# Patient Record
Sex: MALE | Race: WHITE | NOT HISPANIC OR LATINO | Employment: FULL TIME | URBAN - METROPOLITAN AREA
[De-identification: names, ages, dates, MRNs, and addresses within clinical notes are randomized per-mention and may not be internally consistent; named-entity substitution may affect disease eponyms.]

---

## 2017-01-11 ENCOUNTER — HOSPITAL ENCOUNTER (OUTPATIENT)
Dept: LAB | Facility: MEDICAL CENTER | Age: 42
End: 2017-01-11
Attending: FAMILY MEDICINE
Payer: COMMERCIAL

## 2017-01-11 LAB
INR PPP: 2.18 (ref 0.87–1.13)
PROTHROMBIN TIME: 24.9 SEC (ref 12–14.6)

## 2017-01-11 PROCEDURE — 85610 PROTHROMBIN TIME: CPT

## 2017-01-11 PROCEDURE — 36415 COLL VENOUS BLD VENIPUNCTURE: CPT

## 2017-03-01 ENCOUNTER — HOSPITAL ENCOUNTER (OUTPATIENT)
Dept: LAB | Facility: MEDICAL CENTER | Age: 42
End: 2017-03-01
Attending: FAMILY MEDICINE
Payer: COMMERCIAL

## 2017-03-01 LAB
INR PPP: 2.11 (ref 0.87–1.13)
PROTHROMBIN TIME: 24.3 SEC (ref 12–14.6)

## 2017-03-01 PROCEDURE — 85610 PROTHROMBIN TIME: CPT

## 2017-03-01 PROCEDURE — 36415 COLL VENOUS BLD VENIPUNCTURE: CPT

## 2017-04-27 ENCOUNTER — HOSPITAL ENCOUNTER (OUTPATIENT)
Dept: LAB | Facility: MEDICAL CENTER | Age: 42
End: 2017-04-27
Attending: FAMILY MEDICINE
Payer: COMMERCIAL

## 2017-04-27 LAB
INR PPP: 1.85 (ref 0.87–1.13)
PROTHROMBIN TIME: 21.9 SEC (ref 12–14.6)

## 2017-04-27 PROCEDURE — 36415 COLL VENOUS BLD VENIPUNCTURE: CPT

## 2017-04-27 PROCEDURE — 85610 PROTHROMBIN TIME: CPT

## 2017-08-17 ENCOUNTER — HOSPITAL ENCOUNTER (OUTPATIENT)
Dept: LAB | Facility: MEDICAL CENTER | Age: 42
End: 2017-08-17
Attending: FAMILY MEDICINE
Payer: COMMERCIAL

## 2017-08-17 LAB
INR PPP: 1.92 (ref 0.87–1.13)
PROTHROMBIN TIME: 22.6 SEC (ref 12–14.6)

## 2017-08-17 PROCEDURE — 36415 COLL VENOUS BLD VENIPUNCTURE: CPT

## 2017-08-17 PROCEDURE — 85610 PROTHROMBIN TIME: CPT

## 2018-01-16 ENCOUNTER — HOSPITAL ENCOUNTER (OUTPATIENT)
Dept: LAB | Facility: MEDICAL CENTER | Age: 43
End: 2018-01-16
Attending: FAMILY MEDICINE
Payer: COMMERCIAL

## 2018-01-16 LAB
INR PPP: 2.16 (ref 0.87–1.13)
PROTHROMBIN TIME: 23.8 SEC (ref 12–14.6)

## 2018-01-16 PROCEDURE — 85610 PROTHROMBIN TIME: CPT

## 2018-01-16 PROCEDURE — 36415 COLL VENOUS BLD VENIPUNCTURE: CPT

## 2018-04-26 ENCOUNTER — HOSPITAL ENCOUNTER (OUTPATIENT)
Dept: LAB | Facility: MEDICAL CENTER | Age: 43
End: 2018-04-26
Attending: FAMILY MEDICINE
Payer: COMMERCIAL

## 2018-04-26 LAB
INR PPP: 1.63 (ref 0.87–1.13)
PROTHROMBIN TIME: 19 SEC (ref 12–14.6)

## 2018-04-26 PROCEDURE — 36415 COLL VENOUS BLD VENIPUNCTURE: CPT

## 2018-04-26 PROCEDURE — 85610 PROTHROMBIN TIME: CPT

## 2018-05-10 ENCOUNTER — HOSPITAL ENCOUNTER (OUTPATIENT)
Dept: LAB | Facility: MEDICAL CENTER | Age: 43
End: 2018-05-10
Attending: FAMILY MEDICINE
Payer: COMMERCIAL

## 2018-05-10 LAB
INR PPP: 3.5 (ref 0.87–1.13)
PROTHROMBIN TIME: 34.9 SEC (ref 12–14.6)

## 2018-05-10 PROCEDURE — 85610 PROTHROMBIN TIME: CPT

## 2018-05-10 PROCEDURE — 36415 COLL VENOUS BLD VENIPUNCTURE: CPT

## 2018-07-26 ENCOUNTER — HOSPITAL ENCOUNTER (OUTPATIENT)
Dept: LAB | Facility: MEDICAL CENTER | Age: 43
End: 2018-07-26
Attending: FAMILY MEDICINE
Payer: COMMERCIAL

## 2018-07-26 LAB
ALBUMIN SERPL BCP-MCNC: 4.4 G/DL (ref 3.2–4.9)
ALBUMIN/GLOB SERPL: 1.8 G/DL
ALP SERPL-CCNC: 52 U/L (ref 30–99)
ALT SERPL-CCNC: 35 U/L (ref 2–50)
ANION GAP SERPL CALC-SCNC: 8 MMOL/L (ref 0–11.9)
AST SERPL-CCNC: 30 U/L (ref 12–45)
BASOPHILS # BLD AUTO: 0.8 % (ref 0–1.8)
BASOPHILS # BLD: 0.04 K/UL (ref 0–0.12)
BILIRUB SERPL-MCNC: 1.3 MG/DL (ref 0.1–1.5)
BUN SERPL-MCNC: 18 MG/DL (ref 8–22)
CALCIUM SERPL-MCNC: 9.4 MG/DL (ref 8.5–10.5)
CHLORIDE SERPL-SCNC: 106 MMOL/L (ref 96–112)
CHOLEST SERPL-MCNC: 220 MG/DL (ref 100–199)
CO2 SERPL-SCNC: 24 MMOL/L (ref 20–33)
CREAT SERPL-MCNC: 0.92 MG/DL (ref 0.5–1.4)
EOSINOPHIL # BLD AUTO: 0.17 K/UL (ref 0–0.51)
EOSINOPHIL NFR BLD: 3.3 % (ref 0–6.9)
ERYTHROCYTE [DISTWIDTH] IN BLOOD BY AUTOMATED COUNT: 39 FL (ref 35.9–50)
GLOBULIN SER CALC-MCNC: 2.5 G/DL (ref 1.9–3.5)
GLUCOSE SERPL-MCNC: 96 MG/DL (ref 65–99)
HCT VFR BLD AUTO: 48 % (ref 42–52)
HGB BLD-MCNC: 16.6 G/DL (ref 14–18)
IMM GRANULOCYTES # BLD AUTO: 0 K/UL (ref 0–0.11)
IMM GRANULOCYTES NFR BLD AUTO: 0 % (ref 0–0.9)
INR PPP: 1.75 (ref 0.87–1.13)
LYMPHOCYTES # BLD AUTO: 2.04 K/UL (ref 1–4.8)
LYMPHOCYTES NFR BLD: 40.2 % (ref 22–41)
MCH RBC QN AUTO: 30.5 PG (ref 27–33)
MCHC RBC AUTO-ENTMCNC: 34.6 G/DL (ref 33.7–35.3)
MCV RBC AUTO: 88.2 FL (ref 81.4–97.8)
MONOCYTES # BLD AUTO: 0.5 K/UL (ref 0–0.85)
MONOCYTES NFR BLD AUTO: 9.8 % (ref 0–13.4)
NEUTROPHILS # BLD AUTO: 2.33 K/UL (ref 1.82–7.42)
NEUTROPHILS NFR BLD: 45.9 % (ref 44–72)
NRBC # BLD AUTO: 0 K/UL
NRBC BLD-RTO: 0 /100 WBC
PLATELET # BLD AUTO: 164 K/UL (ref 164–446)
PMV BLD AUTO: 11.1 FL (ref 9–12.9)
POTASSIUM SERPL-SCNC: 4.2 MMOL/L (ref 3.6–5.5)
PROT SERPL-MCNC: 6.9 G/DL (ref 6–8.2)
PROTHROMBIN TIME: 20.1 SEC (ref 12–14.6)
RBC # BLD AUTO: 5.44 M/UL (ref 4.7–6.1)
SODIUM SERPL-SCNC: 138 MMOL/L (ref 135–145)
WBC # BLD AUTO: 5.1 K/UL (ref 4.8–10.8)

## 2018-07-26 PROCEDURE — 85610 PROTHROMBIN TIME: CPT

## 2018-07-26 PROCEDURE — 85025 COMPLETE CBC W/AUTO DIFF WBC: CPT

## 2018-07-26 PROCEDURE — 80053 COMPREHEN METABOLIC PANEL: CPT

## 2018-07-26 PROCEDURE — 36415 COLL VENOUS BLD VENIPUNCTURE: CPT

## 2018-07-26 PROCEDURE — 82465 ASSAY BLD/SERUM CHOLESTEROL: CPT

## 2018-10-08 ENCOUNTER — IMMUNIZATION (OUTPATIENT)
Dept: SOCIAL WORK | Facility: CLINIC | Age: 43
End: 2018-10-08
Payer: COMMERCIAL

## 2018-10-08 DIAGNOSIS — Z23 NEED FOR VACCINATION: ICD-10-CM

## 2018-10-08 PROCEDURE — 90686 IIV4 VACC NO PRSV 0.5 ML IM: CPT | Performed by: REGISTERED NURSE

## 2018-10-08 PROCEDURE — 90471 IMMUNIZATION ADMIN: CPT | Performed by: REGISTERED NURSE

## 2018-10-23 ENCOUNTER — HOSPITAL ENCOUNTER (OUTPATIENT)
Dept: LAB | Facility: MEDICAL CENTER | Age: 43
End: 2018-10-23
Attending: FAMILY MEDICINE
Payer: COMMERCIAL

## 2018-10-23 LAB
INR PPP: 2.24 (ref 0.87–1.13)
PROTHROMBIN TIME: 24.9 SEC (ref 12–14.6)

## 2018-10-23 PROCEDURE — 85610 PROTHROMBIN TIME: CPT

## 2018-10-23 PROCEDURE — 36415 COLL VENOUS BLD VENIPUNCTURE: CPT

## 2019-02-20 ENCOUNTER — HOSPITAL ENCOUNTER (OUTPATIENT)
Dept: LAB | Facility: MEDICAL CENTER | Age: 44
End: 2019-02-20
Attending: FAMILY MEDICINE
Payer: COMMERCIAL

## 2019-02-20 LAB
INR PPP: 1.7 (ref 0.87–1.13)
PROTHROMBIN TIME: 20 SEC (ref 12–14.6)

## 2019-02-20 PROCEDURE — 36415 COLL VENOUS BLD VENIPUNCTURE: CPT

## 2019-02-20 PROCEDURE — 85610 PROTHROMBIN TIME: CPT

## 2019-03-27 ENCOUNTER — APPOINTMENT (RX ONLY)
Dept: URBAN - METROPOLITAN AREA CLINIC 20 | Facility: CLINIC | Age: 44
Setting detail: DERMATOLOGY
End: 2019-03-27

## 2019-03-27 DIAGNOSIS — D22 MELANOCYTIC NEVI: ICD-10-CM

## 2019-03-27 DIAGNOSIS — L57.0 ACTINIC KERATOSIS: ICD-10-CM

## 2019-03-27 DIAGNOSIS — L57.8 OTHER SKIN CHANGES DUE TO CHRONIC EXPOSURE TO NONIONIZING RADIATION: ICD-10-CM

## 2019-03-27 DIAGNOSIS — D18.0 HEMANGIOMA: ICD-10-CM

## 2019-03-27 DIAGNOSIS — L82.1 OTHER SEBORRHEIC KERATOSIS: ICD-10-CM

## 2019-03-27 DIAGNOSIS — L73.2 HIDRADENITIS SUPPURATIVA: ICD-10-CM

## 2019-03-27 DIAGNOSIS — L81.4 OTHER MELANIN HYPERPIGMENTATION: ICD-10-CM

## 2019-03-27 PROBLEM — D22.5 MELANOCYTIC NEVI OF TRUNK: Status: ACTIVE | Noted: 2019-03-27

## 2019-03-27 PROBLEM — D22.61 MELANOCYTIC NEVI OF RIGHT UPPER LIMB, INCLUDING SHOULDER: Status: ACTIVE | Noted: 2019-03-27

## 2019-03-27 PROBLEM — D22.72 MELANOCYTIC NEVI OF LEFT LOWER LIMB, INCLUDING HIP: Status: ACTIVE | Noted: 2019-03-27

## 2019-03-27 PROBLEM — I63.50 CEREBRAL INFARCTION DUE TO UNSPECIFIED OCCLUSION OR STENOSIS OF UNSPECIFIED CEREBRAL ARTERY: Status: ACTIVE | Noted: 2019-03-27

## 2019-03-27 PROBLEM — D22.62 MELANOCYTIC NEVI OF LEFT UPPER LIMB, INCLUDING SHOULDER: Status: ACTIVE | Noted: 2019-03-27

## 2019-03-27 PROBLEM — D18.01 HEMANGIOMA OF SKIN AND SUBCUTANEOUS TISSUE: Status: ACTIVE | Noted: 2019-03-27

## 2019-03-27 PROBLEM — D22.71 MELANOCYTIC NEVI OF RIGHT LOWER LIMB, INCLUDING HIP: Status: ACTIVE | Noted: 2019-03-27

## 2019-03-27 PROCEDURE — ? COUNSELING

## 2019-03-27 PROCEDURE — 17000 DESTRUCT PREMALG LESION: CPT

## 2019-03-27 PROCEDURE — ? LIQUID NITROGEN

## 2019-03-27 PROCEDURE — ? OBSERVATION AND MEASURE

## 2019-03-27 PROCEDURE — 99214 OFFICE O/P EST MOD 30 MIN: CPT | Mod: 25

## 2019-03-27 ASSESSMENT — LOCATION DETAILED DESCRIPTION DERM
LOCATION DETAILED: LEFT MEDIAL SUPERIOR CHEST
LOCATION DETAILED: RIGHT DISTAL POSTERIOR THIGH
LOCATION DETAILED: RIGHT ANTERIOR PROXIMAL UPPER ARM
LOCATION DETAILED: LEFT INFERIOR CENTRAL MALAR CHEEK
LOCATION DETAILED: RIGHT PROXIMAL DORSAL FOREARM
LOCATION DETAILED: RIGHT SUPERIOR MEDIAL UPPER BACK
LOCATION DETAILED: LEFT ANTERIOR DISTAL THIGH
LOCATION DETAILED: RIGHT ANTERIOR DISTAL THIGH
LOCATION DETAILED: RIGHT CENTRAL MALAR CHEEK
LOCATION DETAILED: LEFT ANTERIOR PROXIMAL UPPER ARM
LOCATION DETAILED: LEFT LATERAL KNEE
LOCATION DETAILED: RIGHT INFERIOR UPPER BACK
LOCATION DETAILED: RIGHT MID-UPPER BACK
LOCATION DETAILED: LEFT PROXIMAL DORSAL FOREARM
LOCATION DETAILED: LEFT SUPERIOR HELIX
LOCATION DETAILED: LEFT DISTAL POSTERIOR THIGH

## 2019-03-27 ASSESSMENT — LOCATION SIMPLE DESCRIPTION DERM
LOCATION SIMPLE: RIGHT THIGH
LOCATION SIMPLE: RIGHT CHEEK
LOCATION SIMPLE: RIGHT UPPER BACK
LOCATION SIMPLE: LEFT EAR
LOCATION SIMPLE: RIGHT FOREARM
LOCATION SIMPLE: RIGHT POSTERIOR THIGH
LOCATION SIMPLE: LEFT CHEEK
LOCATION SIMPLE: LEFT POSTERIOR THIGH
LOCATION SIMPLE: LEFT THIGH
LOCATION SIMPLE: LEFT KNEE
LOCATION SIMPLE: LEFT UPPER ARM
LOCATION SIMPLE: CHEST
LOCATION SIMPLE: LEFT FOREARM
LOCATION SIMPLE: RIGHT UPPER ARM

## 2019-03-27 ASSESSMENT — LOCATION ZONE DERM
LOCATION ZONE: EAR
LOCATION ZONE: TRUNK
LOCATION ZONE: LEG
LOCATION ZONE: ARM
LOCATION ZONE: FACE

## 2019-05-22 ENCOUNTER — HOSPITAL ENCOUNTER (OUTPATIENT)
Dept: LAB | Facility: MEDICAL CENTER | Age: 44
End: 2019-05-22
Attending: FAMILY MEDICINE
Payer: COMMERCIAL

## 2019-05-22 LAB
INR PPP: 1.91 (ref 0.87–1.13)
PROTHROMBIN TIME: 22 SEC (ref 12–14.6)

## 2019-05-22 PROCEDURE — 85610 PROTHROMBIN TIME: CPT

## 2019-05-22 PROCEDURE — 36415 COLL VENOUS BLD VENIPUNCTURE: CPT

## 2019-11-07 ENCOUNTER — HOSPITAL ENCOUNTER (OUTPATIENT)
Dept: LAB | Facility: MEDICAL CENTER | Age: 44
End: 2019-11-07
Attending: FAMILY MEDICINE
Payer: COMMERCIAL

## 2019-11-07 LAB
ALBUMIN SERPL BCP-MCNC: 4.6 G/DL (ref 3.2–4.9)
ALBUMIN/GLOB SERPL: 2.1 G/DL
ALP SERPL-CCNC: 59 U/L (ref 30–99)
ALT SERPL-CCNC: 32 U/L (ref 2–50)
ANION GAP SERPL CALC-SCNC: 9 MMOL/L (ref 0–11.9)
AST SERPL-CCNC: 24 U/L (ref 12–45)
BASOPHILS # BLD AUTO: 0.6 % (ref 0–1.8)
BASOPHILS # BLD: 0.03 K/UL (ref 0–0.12)
BILIRUB SERPL-MCNC: 0.8 MG/DL (ref 0.1–1.5)
BUN SERPL-MCNC: 17 MG/DL (ref 8–22)
CALCIUM SERPL-MCNC: 9.4 MG/DL (ref 8.5–10.5)
CHLORIDE SERPL-SCNC: 107 MMOL/L (ref 96–112)
CO2 SERPL-SCNC: 24 MMOL/L (ref 20–33)
CREAT SERPL-MCNC: 1 MG/DL (ref 0.5–1.4)
EOSINOPHIL # BLD AUTO: 0.17 K/UL (ref 0–0.51)
EOSINOPHIL NFR BLD: 3.1 % (ref 0–6.9)
ERYTHROCYTE [DISTWIDTH] IN BLOOD BY AUTOMATED COUNT: 38.4 FL (ref 35.9–50)
GLOBULIN SER CALC-MCNC: 2.2 G/DL (ref 1.9–3.5)
GLUCOSE SERPL-MCNC: 95 MG/DL (ref 65–99)
HCT VFR BLD AUTO: 47.2 % (ref 42–52)
HGB BLD-MCNC: 16.3 G/DL (ref 14–18)
IMM GRANULOCYTES # BLD AUTO: 0.01 K/UL (ref 0–0.11)
IMM GRANULOCYTES NFR BLD AUTO: 0.2 % (ref 0–0.9)
LYMPHOCYTES # BLD AUTO: 1.83 K/UL (ref 1–4.8)
LYMPHOCYTES NFR BLD: 33.6 % (ref 22–41)
MCH RBC QN AUTO: 31.2 PG (ref 27–33)
MCHC RBC AUTO-ENTMCNC: 34.5 G/DL (ref 33.7–35.3)
MCV RBC AUTO: 90.2 FL (ref 81.4–97.8)
MONOCYTES # BLD AUTO: 0.56 K/UL (ref 0–0.85)
MONOCYTES NFR BLD AUTO: 10.3 % (ref 0–13.4)
NEUTROPHILS # BLD AUTO: 2.85 K/UL (ref 1.82–7.42)
NEUTROPHILS NFR BLD: 52.2 % (ref 44–72)
NRBC # BLD AUTO: 0 K/UL
NRBC BLD-RTO: 0 /100 WBC
PLATELET # BLD AUTO: 172 K/UL (ref 164–446)
PMV BLD AUTO: 11.6 FL (ref 9–12.9)
POTASSIUM SERPL-SCNC: 4.4 MMOL/L (ref 3.6–5.5)
PROT SERPL-MCNC: 6.8 G/DL (ref 6–8.2)
RBC # BLD AUTO: 5.23 M/UL (ref 4.7–6.1)
SODIUM SERPL-SCNC: 140 MMOL/L (ref 135–145)
WBC # BLD AUTO: 5.5 K/UL (ref 4.8–10.8)

## 2019-11-07 PROCEDURE — 86147 CARDIOLIPIN ANTIBODY EA IG: CPT

## 2019-11-07 PROCEDURE — 85025 COMPLETE CBC W/AUTO DIFF WBC: CPT

## 2019-11-07 PROCEDURE — 80053 COMPREHEN METABOLIC PANEL: CPT

## 2019-11-07 PROCEDURE — 86146 BETA-2 GLYCOPROTEIN ANTIBODY: CPT | Mod: 91

## 2019-11-07 PROCEDURE — 36415 COLL VENOUS BLD VENIPUNCTURE: CPT

## 2019-11-10 LAB
B2 GLYCOPROT1 IGA SER-ACNC: 5 SAU (ref 0–20)
B2 GLYCOPROT1 IGG SERPL IA-ACNC: 21 SGU (ref 0–20)
B2 GLYCOPROT1 IGM SERPL IA-ACNC: 1 SMU (ref 0–20)
CARDIOLIPIN IGA SER IA-ACNC: 0 APL (ref 0–11)
CARDIOLIPIN IGG SER IA-ACNC: 3 GPL (ref 0–14)
CARDIOLIPIN IGM SER IA-ACNC: 7 MPL (ref 0–12)

## 2020-02-25 ENCOUNTER — HOSPITAL ENCOUNTER (OUTPATIENT)
Dept: LAB | Facility: MEDICAL CENTER | Age: 45
End: 2020-02-25
Attending: FAMILY MEDICINE
Payer: COMMERCIAL

## 2020-02-25 PROCEDURE — 36415 COLL VENOUS BLD VENIPUNCTURE: CPT

## 2020-02-25 PROCEDURE — 86146 BETA-2 GLYCOPROTEIN ANTIBODY: CPT | Mod: 91

## 2020-02-27 LAB
B2 GLYCOPROT1 IGG SERPL IA-ACNC: 26 SGU (ref 0–20)
B2 GLYCOPROT1 IGM SERPL IA-ACNC: 10 SMU (ref 0–20)

## 2020-03-30 ENCOUNTER — APPOINTMENT (OUTPATIENT)
Dept: HEMATOLOGY ONCOLOGY | Facility: MEDICAL CENTER | Age: 45
End: 2020-03-30
Payer: COMMERCIAL

## 2020-07-28 ENCOUNTER — HOSPITAL ENCOUNTER (OUTPATIENT)
Dept: LAB | Facility: MEDICAL CENTER | Age: 45
End: 2020-07-28
Attending: INTERNAL MEDICINE
Payer: COMMERCIAL

## 2020-07-28 ENCOUNTER — OFFICE VISIT (OUTPATIENT)
Dept: HEMATOLOGY ONCOLOGY | Facility: MEDICAL CENTER | Age: 45
End: 2020-07-28
Payer: COMMERCIAL

## 2020-07-28 VITALS
WEIGHT: 252.21 LBS | TEMPERATURE: 98.2 F | DIASTOLIC BLOOD PRESSURE: 74 MMHG | HEIGHT: 78 IN | HEART RATE: 60 BPM | OXYGEN SATURATION: 99 % | BODY MASS INDEX: 29.18 KG/M2 | RESPIRATION RATE: 16 BRPM | SYSTOLIC BLOOD PRESSURE: 130 MMHG

## 2020-07-28 DIAGNOSIS — D68.61 ANTIPHOSPHOLIPID ANTIBODY SYNDROME (HCC): ICD-10-CM

## 2020-07-28 PROCEDURE — 86038 ANTINUCLEAR ANTIBODIES: CPT

## 2020-07-28 PROCEDURE — 86147 CARDIOLIPIN ANTIBODY EA IG: CPT | Mod: 91

## 2020-07-28 PROCEDURE — 99204 OFFICE O/P NEW MOD 45 MIN: CPT | Performed by: INTERNAL MEDICINE

## 2020-07-28 PROCEDURE — 86146 BETA-2 GLYCOPROTEIN ANTIBODY: CPT

## 2020-07-28 PROCEDURE — 36415 COLL VENOUS BLD VENIPUNCTURE: CPT

## 2020-07-28 ASSESSMENT — PATIENT HEALTH QUESTIONNAIRE - PHQ9: CLINICAL INTERPRETATION OF PHQ2 SCORE: 0

## 2020-07-28 ASSESSMENT — FIBROSIS 4 INDEX: FIB4 SCORE: 1.09

## 2020-07-28 NOTE — PROGRESS NOTES
"07/28/20    Subjective    Chief Complaint:  Anticardiolipin antibody syndrome    HPI:  44 male referred by Dr. César Cabezas because of h/o DVT and TIA and a positive anti beta 2 glycoprotein titer a few months ago.   Has a h/o a significant stroke 15 years ago with aphasia and right hemiparesis requiring rehab He may have been seen by Dr. Rasmussen at that time and was diagnosed with antiphospholipid antibody syndrome. Has been on coumadin ever since, recently changed to Xarelto about 2 months ago. No h/o DVT. No FH of clots or strokes.     ROS:    Constitutional: No weight loss  Skin: No rash or jaundice  HENT: No change in eyesight or hearing  Cardiovascular:No chest pain or arrythmia  Respiratory:No cough or SOB  GI:No nausea, vomiting, diarrhea, constipation  :No dysuria or frequency  Musculoskeletal:No bone or joint pain  Neuro:No sx's of neuropathy  Psych: No complaints    PMH:      No Known Allergies    Past Medical History:   Diagnosis Date   • Cancer    • Stroke '04        No past surgical history on file.     Medications:    Current Outpatient Medications on File Prior to Visit   Medication Sig Dispense Refill   • rivaroxaban (XARELTO) 10 MG Tab tablet Take 10 mg by mouth.     • COUMADIN PO Take  by mouth.     • SIMVASTATIN PO Take  by mouth.       No current facility-administered medications on file prior to visit.        Social History     Tobacco Use   • Smoking status: Current Every Day Smoker   • Tobacco comment: CHEW   Substance Use Topics   • Alcohol use: No        No family history on file.     Objective    Vitals:    /74 (BP Location: Right arm, Patient Position: Sitting, BP Cuff Size: Adult)   Pulse 60   Temp 36.8 °C (98.2 °F) (Temporal)   Resp 16   Ht 1.97 m (6' 5.56\")   Wt 114.4 kg (252 lb 3.3 oz)   SpO2 99%   BMI 29.48 kg/m²     Physical Exam:    Appears well-developed and well-nourished. No distress.    Head -  Normocephalic .   Eyes - Pupils are equal, round, and reactive to " light. Conjunctivae and EOM are normal. No scleral icterus.   Ears - normal hearing  Mouth - Throat: Oropharynx is clear and moist. No oropharyngeal exudate  Neck - Normal range of motion. Neck supple. No thyromegaly  Cardiovascular - Normal rate, regular rhythm, normal heart sounds and intact distal pulses. No  gallop, murmur or rub  Pulmonary - Normal breath sounds.  No wheeze, rales or rhonci  Abdominal -Soft. No distension, tenderness, organomegaly or mass  Extremities-  No edema or tenderness.    Nodes - No submental, submandibular, preauricular, cervical, axillary or inguinal adenopathy.    Neurological -   Alert and oriented to person, place, and time. No focal findings  Skin - Skin is warm and dry. No rash noted. Not diaphoretic. No erythema. No pallor. No jaundice   Psychiatric -  Normal mood and affect.    Labs:      Assessment    Imp:Results for BRIEN CARLISLE (MRN [id number]) as of 7/28/2020 09:22   Ref. Range 11/7/2019 09:04 2/25/2020 13:06   Anti-Cardiolipin Ab Iga Latest Ref Range: 0 - 11 APL 0    Anti-Cardiolipin Ab Igm Latest Ref Range: 0 - 12 MPL 7    Anti-Cariolipin Ab Igg Latest Ref Range: 0 - 14 GPL 3    Beta-2 Glycoprotein I Ab Igg Latest Ref Range: 0 - 20 SGU 21 (H) 26 (H)   Beta-2 Glycoprotein I Iga Latest Ref Range: 0 - 20 TRINA 5    Beta-2 Glycoprotein I Igm Latest Ref Range: 0 - 20 SMU 1 10       Visit Diagnosis:    1. Antiphospholipid antibody syndrome (HCC)  BETA-2 GLYCOPROTEIN AB,IGG,IGM    LESLIE REFLEXIVE PROFILE    ANTICARDIOLIPIN AB IGG,IGM,IGA         Plan:  Above lab - if confirmatory, the literature does suggest that coumadin is more effective than Xa inhibitors or direct thromib inhibitors for this syndrome.   Patient will call me later this week for lab results    Ketan Main M.D.

## 2020-07-30 LAB
CARDIOLIPIN IGA SER IA-ACNC: 3 APL (ref 0–11)
CARDIOLIPIN IGG SER IA-ACNC: 6 GPL (ref 0–14)
CARDIOLIPIN IGM SER IA-ACNC: 4 MPL (ref 0–12)
NUCLEAR IGG SER QL IA: NORMAL

## 2020-07-31 ENCOUNTER — TELEPHONE (OUTPATIENT)
Dept: HEMATOLOGY ONCOLOGY | Facility: MEDICAL CENTER | Age: 45
End: 2020-07-31

## 2020-07-31 LAB
B2 GLYCOPROT1 IGG SERPL IA-ACNC: 26 SGU (ref 0–20)
B2 GLYCOPROT1 IGM SERPL IA-ACNC: 5 SMU (ref 0–20)

## 2020-07-31 NOTE — TELEPHONE ENCOUNTER
Per Dr. Jose David Main M.D.  Lottie Frazier               You might call and let him know the lab still confirms antiphospholipid antibody syndrome so he is stuck with having to continue coumadin indefinitely.      Called pt and reported above information. Pt agreed and verbalized understanding.

## 2020-08-31 ENCOUNTER — APPOINTMENT (RX ONLY)
Dept: URBAN - METROPOLITAN AREA CLINIC 20 | Facility: CLINIC | Age: 45
Setting detail: DERMATOLOGY
End: 2020-08-31

## 2020-08-31 DIAGNOSIS — L57.8 OTHER SKIN CHANGES DUE TO CHRONIC EXPOSURE TO NONIONIZING RADIATION: ICD-10-CM

## 2020-08-31 DIAGNOSIS — L73.8 OTHER SPECIFIED FOLLICULAR DISORDERS: ICD-10-CM | Status: STABLE

## 2020-08-31 DIAGNOSIS — D22 MELANOCYTIC NEVI: ICD-10-CM

## 2020-08-31 DIAGNOSIS — L81.4 OTHER MELANIN HYPERPIGMENTATION: ICD-10-CM

## 2020-08-31 DIAGNOSIS — L82.1 OTHER SEBORRHEIC KERATOSIS: ICD-10-CM

## 2020-08-31 DIAGNOSIS — D18.0 HEMANGIOMA: ICD-10-CM

## 2020-08-31 DIAGNOSIS — L73.2 HIDRADENITIS SUPPURATIVA: ICD-10-CM | Status: WELL CONTROLLED

## 2020-08-31 PROBLEM — D22.61 MELANOCYTIC NEVI OF RIGHT UPPER LIMB, INCLUDING SHOULDER: Status: ACTIVE | Noted: 2020-08-31

## 2020-08-31 PROBLEM — D18.01 HEMANGIOMA OF SKIN AND SUBCUTANEOUS TISSUE: Status: ACTIVE | Noted: 2020-08-31

## 2020-08-31 PROBLEM — D22.5 MELANOCYTIC NEVI OF TRUNK: Status: ACTIVE | Noted: 2020-08-31

## 2020-08-31 PROBLEM — D22.72 MELANOCYTIC NEVI OF LEFT LOWER LIMB, INCLUDING HIP: Status: ACTIVE | Noted: 2020-08-31

## 2020-08-31 PROBLEM — D22.62 MELANOCYTIC NEVI OF LEFT UPPER LIMB, INCLUDING SHOULDER: Status: ACTIVE | Noted: 2020-08-31

## 2020-08-31 PROBLEM — D22.71 MELANOCYTIC NEVI OF RIGHT LOWER LIMB, INCLUDING HIP: Status: ACTIVE | Noted: 2020-08-31

## 2020-08-31 PROCEDURE — ? COUNSELING

## 2020-08-31 PROCEDURE — ? OBSERVATION AND MEASURE

## 2020-08-31 PROCEDURE — ? ADDITIONAL NOTES

## 2020-08-31 PROCEDURE — 99214 OFFICE O/P EST MOD 30 MIN: CPT

## 2020-08-31 ASSESSMENT — LOCATION SIMPLE DESCRIPTION DERM
LOCATION SIMPLE: LEFT CHEEK
LOCATION SIMPLE: RIGHT THIGH
LOCATION SIMPLE: LEFT KNEE
LOCATION SIMPLE: RIGHT UPPER BACK
LOCATION SIMPLE: RIGHT UPPER ARM
LOCATION SIMPLE: LEFT UPPER ARM
LOCATION SIMPLE: RIGHT CHEEK
LOCATION SIMPLE: CHEST
LOCATION SIMPLE: RIGHT FOREARM
LOCATION SIMPLE: LEFT POSTERIOR THIGH
LOCATION SIMPLE: RIGHT POSTERIOR THIGH
LOCATION SIMPLE: LEFT THIGH
LOCATION SIMPLE: LEFT FOREARM

## 2020-08-31 ASSESSMENT — LOCATION DETAILED DESCRIPTION DERM
LOCATION DETAILED: RIGHT CENTRAL MALAR CHEEK
LOCATION DETAILED: LEFT DISTAL POSTERIOR THIGH
LOCATION DETAILED: RIGHT INFERIOR UPPER BACK
LOCATION DETAILED: RIGHT ANTERIOR DISTAL THIGH
LOCATION DETAILED: LEFT LATERAL KNEE
LOCATION DETAILED: LEFT MEDIAL SUPERIOR CHEST
LOCATION DETAILED: LEFT PROXIMAL DORSAL FOREARM
LOCATION DETAILED: RIGHT SUPERIOR MEDIAL UPPER BACK
LOCATION DETAILED: LEFT ANTERIOR PROXIMAL UPPER ARM
LOCATION DETAILED: LEFT INFERIOR CENTRAL MALAR CHEEK
LOCATION DETAILED: LEFT ANTERIOR DISTAL THIGH
LOCATION DETAILED: RIGHT MID-UPPER BACK
LOCATION DETAILED: RIGHT ANTERIOR PROXIMAL UPPER ARM
LOCATION DETAILED: RIGHT DISTAL POSTERIOR THIGH
LOCATION DETAILED: RIGHT PROXIMAL DORSAL FOREARM

## 2020-08-31 ASSESSMENT — LOCATION ZONE DERM
LOCATION ZONE: TRUNK
LOCATION ZONE: ARM
LOCATION ZONE: LEG
LOCATION ZONE: FACE

## 2020-08-31 NOTE — HPI: MOLE CHECK
What Is The Reason For Today's Visit?: Mole Check
Additional History: Patient presents for follow up on an Observe and Monitor benign nevi of the left lateral knee. 1 cm x 9mm

## 2020-09-18 DIAGNOSIS — D68.61 ANTIPHOSPHOLIPID ANTIBODY SYNDROME (HCC): ICD-10-CM

## 2020-09-21 ENCOUNTER — ANTICOAGULATION VISIT (OUTPATIENT)
Dept: VASCULAR LAB | Facility: MEDICAL CENTER | Age: 45
End: 2020-09-21
Attending: INTERNAL MEDICINE
Payer: COMMERCIAL

## 2020-09-21 DIAGNOSIS — D68.61 ANTIPHOSPHOLIPID ANTIBODY SYNDROME (HCC): ICD-10-CM

## 2020-09-21 LAB — INR PPP: 1 (ref 2–3.5)

## 2020-09-21 PROCEDURE — 85610 PROTHROMBIN TIME: CPT

## 2020-09-21 PROCEDURE — 99212 OFFICE O/P EST SF 10 MIN: CPT

## 2020-09-21 RX ORDER — WARFARIN SODIUM 5 MG/1
TABLET ORAL
Qty: 30 TAB | Refills: 3 | Status: SHIPPED | OUTPATIENT
Start: 2020-09-21 | End: 2020-10-20 | Stop reason: SDUPTHER

## 2020-09-21 NOTE — PROGRESS NOTES
Pt is NOT new to warfarin but is new to [facility].  Discussed indication for warfarin therapy and INR goal range. Explained our services, hours of operation, warfarin therapy, potential SE, potential DI. Discussed diet at length, with an emphasis on foods rich in vitamin K.  Discussed monitoring parameters, such as blood in urine, blood in stool, discussed what to do if a dose is missed, or suspected as missed.  Emphasized importance of compliance including follow up. Discussed lifestyle choices of ETOH & smoking and its impact on therapy.    HPI: Mahesh De Paz is a 45yo male who arrives at [facility] today to transition from Xarelto to warfarin. He was recently diagnosed with antiphospholipid antibody (APL) syndrome, though the time he was diagnosed is unclear. He is not new to warfarin and had been taking warfarin ever since his first stroke 15 years ago and was being followed by Dr. Cabezas. He claims he was stable on a regimen of 7.5mg one day and 5mg all other days per week. Past note says that patient also had a DVT before, but when pt was asked he denied any DVT, only his stroke. Recently, however, he was switched over to Xarelto 10mg once daily 2 months ago.  He was referred to us by Dr. Theron Herbert to convert back from Xarelto to warfarin due to his diagnosis of APL syndrome.    S/O:  ASA, NSAIDs, Antiplatelets No  Other medications: Simvastatin - pt says he is no longer taking this so removed from his med list  Can pt be transitioned to DOAC? No, pt has been diagnosed with APL syndrome so warfarin is the preferred anticoagulant.  Pt signed new patient contract.  Copy will be scanned into media.   Pt denies any unusual s/s of bleeding, bruising, clotting or any changes to diet or medications.    CHADSVASC = n/a  HASBLED= n/a    Abnormal  Liver (cirrhosis, bilirubin 2x ULN, AST/ALT) No    Kidney (SCr >2.6) No  Stroke Yes. Stroke 15 yrs ago w/ aphasia and rt hemiparesis requiring rehab.  Bleeding predisposition  No  Labile INR Unknown  Elderly (>65) No  Drugs (Nsaid, antiplatelet, alcohol) No    A/P:  Told patient that he would need to bridge with Lovenox until his INR becomes therapeutic. However, patient does not wish to proceed with injections and would rather avoid altogether. Will have patient instead bridge off-label with Xarelto 10mg qday (his current regimen) and start warfarin today. Based on pt's past history with warfarin, will have him take 7.5mg today and then take 5mg every other day. Sent over an Rx for warfarin 5mg tablets to his preferred pharmacy.    Of note, pt likely will get a repeat cmp/cbc at his upcoming annual wellness check in November.    Follow up in 3 days on Thursday 9/24 per pt preference.    Lorena Whiting, PharmD   Discussed w/ Rita Queen, MaeganD  CC: Dr. Michael Bloch, César Peña PharmD    Added [facility] to care team

## 2020-09-22 ENCOUNTER — TELEPHONE (OUTPATIENT)
Dept: VASCULAR LAB | Facility: MEDICAL CENTER | Age: 45
End: 2020-09-22

## 2020-09-22 LAB — INR BLD: 1 (ref 0.9–1.2)

## 2020-09-24 ENCOUNTER — ANTICOAGULATION VISIT (OUTPATIENT)
Dept: VASCULAR LAB | Facility: MEDICAL CENTER | Age: 45
End: 2020-09-24
Attending: INTERNAL MEDICINE
Payer: COMMERCIAL

## 2020-09-24 DIAGNOSIS — Z23 NEED FOR VACCINATION: ICD-10-CM

## 2020-09-24 LAB
INR BLD: 1.4 (ref 0.9–1.2)
INR PPP: 1.4 (ref 2–3.5)

## 2020-09-24 PROCEDURE — 90686 IIV4 VACC NO PRSV 0.5 ML IM: CPT

## 2020-09-24 PROCEDURE — 99212 OFFICE O/P EST SF 10 MIN: CPT

## 2020-09-24 PROCEDURE — 85610 PROTHROMBIN TIME: CPT

## 2020-09-28 ENCOUNTER — ANTICOAGULATION VISIT (OUTPATIENT)
Dept: VASCULAR LAB | Facility: MEDICAL CENTER | Age: 45
End: 2020-09-28
Attending: INTERNAL MEDICINE
Payer: COMMERCIAL

## 2020-09-28 VITALS — SYSTOLIC BLOOD PRESSURE: 147 MMHG | DIASTOLIC BLOOD PRESSURE: 96 MMHG | HEART RATE: 56 BPM

## 2020-09-28 DIAGNOSIS — I63.9 CEREBROVASCULAR ACCIDENT (CVA), UNSPECIFIED MECHANISM (HCC): ICD-10-CM

## 2020-09-28 DIAGNOSIS — I82.90 DEEP VEIN THROMBOSIS (DVT) OF NON-EXTREMITY VEIN, UNSPECIFIED CHRONICITY: ICD-10-CM

## 2020-09-28 DIAGNOSIS — G45.9 TRANSIENT ISCHEMIC ATTACK: ICD-10-CM

## 2020-09-28 DIAGNOSIS — D68.61 ANTIPHOSPHOLIPID ANTIBODY SYNDROME (HCC): ICD-10-CM

## 2020-09-28 PROBLEM — I82.409 DEEP VEIN THROMBOSIS (HCC): Status: ACTIVE | Noted: 2020-09-28

## 2020-09-28 LAB
INR BLD: 1.8 (ref 0.9–1.2)
INR PPP: 1.8 (ref 2–3.5)

## 2020-09-28 PROCEDURE — 85610 PROTHROMBIN TIME: CPT

## 2020-09-28 PROCEDURE — 99212 OFFICE O/P EST SF 10 MIN: CPT | Performed by: NURSE PRACTITIONER

## 2020-09-28 NOTE — PROGRESS NOTES
Anticoagulation Summary  As of 2020    INR goal:  2.0-3.0   TTR:  --   INR used for dosin.80 (2020)   Warfarin maintenance plan:  7.5 mg (5 mg x 1.5) every Mon; 5 mg (5 mg x 1) all other days   Weekly warfarin total:  37.5 mg   Plan last modified:  Lorena Whiting, PharmD (2020)   Next INR check:  10/1/2020   Target end date:  Indefinite    Indications    Antiphospholipid antibody syndrome (HCC) [D68.61]  Transient ischemic attack [G45.9]             Anticoagulation Episode Summary     INR check location:      Preferred lab:      Send INR reminders to:      Comments:        Anticoagulation Care Providers     Provider Role Specialty Phone number    Theron CRAVEN M.D. Referring [facility] Medicine [phone]        Anticoagulation Patient Findings      HPI:  Mahesh De Paz seen in clinic today for follow up on anticoagulation therapy in the presence of antiphospholipid antibody syndrome and TIA hx.   He is transitioning from Xarelto to warfarin. His last dose of Xarelto was on Wednesday of last week.  Denies any medication or diet changes.   No current symptoms of bleeding or thrombosis reported.    A/P:   INR subtherapeutic. INR has come up from 1.4. Given INR approaching 2.0 and TIA about 16 years ago, will not bridge. Warfarin as outlined on calendar.   BP recorded in vitals.    Follow up appointment in 3 days.    Next Appointment: Thursday, Oct 1, 2020 at 7:45 am.    Gloria HENDERSON

## 2020-10-01 ENCOUNTER — ANTICOAGULATION VISIT (OUTPATIENT)
Dept: VASCULAR LAB | Facility: MEDICAL CENTER | Age: 45
End: 2020-10-01
Attending: INTERNAL MEDICINE
Payer: COMMERCIAL

## 2020-10-01 DIAGNOSIS — G45.9 TRANSIENT ISCHEMIC ATTACK: ICD-10-CM

## 2020-10-01 DIAGNOSIS — D68.61 ANTIPHOSPHOLIPID ANTIBODY SYNDROME (HCC): ICD-10-CM

## 2020-10-01 LAB — INR PPP: 2.9 (ref 2–3.5)

## 2020-10-01 PROCEDURE — 99211 OFF/OP EST MAY X REQ PHY/QHP: CPT

## 2020-10-01 PROCEDURE — 85610 PROTHROMBIN TIME: CPT

## 2020-10-01 NOTE — PROGRESS NOTES
Anticoagulation Summary  As of 10/1/2020    INR goal:  2.0-3.0   TTR:  --   INR used for dosin.90 (10/1/2020)   Warfarin maintenance plan:  7.5 mg (5 mg x 1.5) every Mon; 5 mg (5 mg x 1) all other days   Weekly warfarin total:  37.5 mg   Plan last modified:  Lorena Whiting, PharmD (2020)   Next INR check:  10/6/2020   Target end date:  Indefinite    Indications    Antiphospholipid antibody syndrome (HCC) [D68.61]  Transient ischemic attack [G45.9]             Anticoagulation Episode Summary     INR check location:      Preferred lab:      Send INR reminders to:      Comments:        Anticoagulation Care Providers     Provider Role Specialty Phone number    Theron CRAVEN M.D. Referring Family Medicine [phone]        Anticoagulation Patient Findings      HPI:  Mahesh De Paz seen in clinic today, on anticoagulation therapy with warfarin for APS and hx of TIA  Changes to current medical/health status since last appt: none  Denies signs/symptoms of bleeding and/or thrombosis since the last appt.    Denies any interval changes to diet.  Denies any interval changes to medications since last appt.   Denies any complications or cost restrictions with current therapy.   BP declined      A/P   INR  therapeutic.   Instructed pt to continue on with current regimen.    Follow up appointment in 5 days     Celine Brown, Pharmacy Intern   Edward Donnelly, PharmD

## 2020-10-05 LAB — INR BLD: 2.9 (ref 0.9–1.2)

## 2020-10-06 ENCOUNTER — ANTICOAGULATION VISIT (OUTPATIENT)
Dept: VASCULAR LAB | Facility: MEDICAL CENTER | Age: 45
End: 2020-10-06
Attending: INTERNAL MEDICINE
Payer: COMMERCIAL

## 2020-10-06 DIAGNOSIS — G45.9 TRANSIENT ISCHEMIC ATTACK: ICD-10-CM

## 2020-10-06 DIAGNOSIS — D68.61 ANTIPHOSPHOLIPID ANTIBODY SYNDROME (HCC): ICD-10-CM

## 2020-10-06 LAB — INR PPP: 2.8 (ref 2–3.5)

## 2020-10-06 PROCEDURE — 85610 PROTHROMBIN TIME: CPT

## 2020-10-06 PROCEDURE — 99211 OFF/OP EST MAY X REQ PHY/QHP: CPT

## 2020-10-06 NOTE — PROGRESS NOTES
OP Anticoagulation Service Note    Date: 10/6/2020    Anticoagulation Summary  As of 10/6/2020    INR goal:  2.0-3.0   TTR:  100.0 % (5 d)   INR used for dosin.80 (10/6/2020)   Warfarin maintenance plan:  7.5 mg (5 mg x 1.5) every Mon; 5 mg (5 mg x 1) all other days   Weekly warfarin total:  37.5 mg   Plan last modified:  Lorena Whiting, PharmD (2020)   Next INR check:  10/13/2020   Target end date:  Indefinite    Indications    Antiphospholipid antibody syndrome (HCC) [D68.61]  Transient ischemic attack [G45.9]             Anticoagulation Episode Summary     INR check location:      Preferred lab:      Send INR reminders to:      Comments:        Anticoagulation Care Providers     Provider Role Specialty Phone number    Theron CRAVEN M.D. Referring Family Medicine [phone]    [facility]   [phone]        Anticoagulation Patient Findings      HPI:   Mahesh De Paz is in the Anticoagulation Clinic today for a INR check on their anticoagulation therapy.     The reason for today's visit is to prevent morbidity and mortality from a blood clot and/or stroke and to reduce the risk of bleeding while on a anticoagulant.     PCP:  Theron CRAVEN M.D.  [id number] [facility] [id number]    3 vitals included with today's appt-unless patient declined:  (BP, HR, weight, ht, RR)   There were no vitals filed for this visit.    Assessment:   INR  therapeutic.   Confirmed warfarin dosing regimen: Yes  Interval Changes with foods rich in vitamin K: No  Interval Changes in ETOH or cranberries:   No  Interval Changes in smoking status:  No  Interval Changes in medication:  No   S/S of bleeding or bruising:  No  Signs/symptoms  thrombosis since the last appt:  No  Any upcoming procedures that require stopping warfarin and/or using bridge therapy: None    Plan:  Continue the same warfarin dose, as noted above.       Follow up:  Follow up appointment in 1 week(s).        Other info:  Pt educated to contact our clinic with any changes in medications or s/s of bleeding or thrombosis.  Education was provided today regarding tips to reduce their bleed risk and dietary constraints while on a anticoagulant.    National Recommendations:  The CHEST guidelines recommends frequent INR monitoring at regular intervals (a few days up to a max of 12 weeks) to ensure patients are on the proper dose of warfarin, and patients are not having any complications from therapy.  INRs can dramatically change over a short time period due to diet, medications, and medical conditions.     Chadwick Ospina, PharmD, MS, BCACP, C    [facility]  Phone [phone] fax [phone]    This note was created using voice recognition software (Dragon). The accuracy of the dictation is limited by the abilities of the software. I have reviewed the note prior to signing, however some errors in grammar and context are still possible. If you have any questions related to this note please do not hesitate to contact our office.

## 2020-10-13 ENCOUNTER — ANTICOAGULATION VISIT (OUTPATIENT)
Dept: VASCULAR LAB | Facility: MEDICAL CENTER | Age: 45
End: 2020-10-13
Attending: INTERNAL MEDICINE
Payer: COMMERCIAL

## 2020-10-13 DIAGNOSIS — G45.9 TRANSIENT ISCHEMIC ATTACK: ICD-10-CM

## 2020-10-13 DIAGNOSIS — D68.61 ANTIPHOSPHOLIPID ANTIBODY SYNDROME (HCC): ICD-10-CM

## 2020-10-13 LAB — INR PPP: 2.3 (ref 2–3.5)

## 2020-10-13 PROCEDURE — 85610 PROTHROMBIN TIME: CPT

## 2020-10-13 PROCEDURE — 99211 OFF/OP EST MAY X REQ PHY/QHP: CPT

## 2020-10-13 NOTE — PROGRESS NOTES
Anticoagulation Summary  As of 10/13/2020    INR goal:  2.0-3.0   TTR:  100.0 % (1.7 wk)   INR used for dosin.30 (10/13/2020)   Warfarin maintenance plan:  7.5 mg (5 mg x 1.5) every Mon; 5 mg (5 mg x 1) all other days   Weekly warfarin total:  37.5 mg   Plan last modified:  Lorena Whiting, PharmD (2020)   Next INR check:  10/27/2020   Target end date:  Indefinite    Indications    Antiphospholipid antibody syndrome (HCC) [D68.61]  Transient ischemic attack [G45.9]             Anticoagulation Episode Summary     INR check location:      Preferred lab:      Send INR reminders to:      Comments:        Anticoagulation Care Providers     Provider Role Specialty Phone number    Theron CRAVEN M.D. Referring Family Medicine [phone]    [facility]   [phone]            Anticoagulation Patient Findings  Patient Findings     Negatives:  Signs/symptoms of thrombosis, Signs/symptoms of bleeding, Laboratory test error suspected, Change in health, Change in alcohol use, Change in activity, Upcoming invasive procedure, Emergency department visit, Upcoming dental procedure, Missed doses, Extra doses, Change in medications, Change in diet/appetite, Hospital admission, Bruising, Other complaints          HPI:  Mahesh De Paz seen in clinic today, on anticoagulation therapy with warfarin for antiphospholipid syndrome, TIA  Changes to current medical/health status since last appt: none  Denies signs/symptoms of bleeding and/or thrombosis since the last appt.    Denies any interval changes to diet  Denies any interval changes to medications since last appt.   Denies any complications or cost restrictions with current therapy.   Verified current warfarin dosing schedule. Pt states he missed a dose on Saturday.  BP declined  Medications reconciled       A/P   INR  therapeutic.   Pt is to continue with current warfarin dosing regimen.    Follow up appointment in 2 week(s).    Rita CHÁVEZ  Kam Queen

## 2020-10-15 LAB — INR BLD: 2.3 (ref 0.9–1.2)

## 2020-10-20 DIAGNOSIS — D68.61 ANTIPHOSPHOLIPID ANTIBODY SYNDROME (HCC): ICD-10-CM

## 2020-10-20 RX ORDER — WARFARIN SODIUM 5 MG/1
TABLET ORAL
Qty: 135 TAB | Refills: 1 | Status: SHIPPED | OUTPATIENT
Start: 2020-10-20 | End: 2021-01-18

## 2020-10-27 ENCOUNTER — ANTICOAGULATION VISIT (OUTPATIENT)
Dept: VASCULAR LAB | Facility: MEDICAL CENTER | Age: 45
End: 2020-10-27
Attending: INTERNAL MEDICINE
Payer: COMMERCIAL

## 2020-10-27 DIAGNOSIS — G45.9 TRANSIENT ISCHEMIC ATTACK: ICD-10-CM

## 2020-10-27 DIAGNOSIS — D68.61 ANTIPHOSPHOLIPID ANTIBODY SYNDROME (HCC): ICD-10-CM

## 2020-10-27 LAB — INR PPP: 2.2 (ref 2–3.5)

## 2020-10-27 PROCEDURE — 85610 PROTHROMBIN TIME: CPT

## 2020-10-27 PROCEDURE — 99211 OFF/OP EST MAY X REQ PHY/QHP: CPT

## 2020-10-27 NOTE — PROGRESS NOTES
Anticoagulation Summary  As of 10/27/2020    INR goal:  2.0-3.0   TTR:  100.0 % (3.7 wk)   INR used for dosin.20 (10/27/2020)   Warfarin maintenance plan:  7.5 mg (5 mg x 1.5) every Mon; 5 mg (5 mg x 1) all other days   Weekly warfarin total:  37.5 mg   Plan last modified:  Lorena Whiting, PharmD (2020)   Next INR check:  2020   Target end date:  Indefinite    Indications    Antiphospholipid antibody syndrome (HCC) [D68.61]  Transient ischemic attack [G45.9]             Anticoagulation Episode Summary     INR check location:      Preferred lab:      Send INR reminders to:      Comments:        Anticoagulation Care Providers     Provider Role Specialty Phone number    Theron CRAVEN M.D. Referring Family Medicine [phone]    [facility]   [phone]        Anticoagulation Patient Findings  Patient Findings     Negatives:  Signs/symptoms of thrombosis, Signs/symptoms of bleeding, Laboratory test error suspected, Change in health, Change in alcohol use, Change in activity, Upcoming invasive procedure, Emergency department visit, Upcoming dental procedure, Missed doses, Extra doses, Change in medications, Change in diet/appetite, Hospital admission, Bruising, Other complaints              History of Present Illness: follow up appointment for chronic anticoagulation with the high risk medication, warfarin for DVT/antiphospholipid      Pt remains therapeutic today. Continue current dosing regimen.  Follow up in 4 weeks, to reduce the risk of adverse events related to this high risk medication, warfarin.  Pt declines vitals today      Stacey Buenrostro, Clinical Pharmacist

## 2020-11-24 ENCOUNTER — ANTICOAGULATION VISIT (OUTPATIENT)
Dept: VASCULAR LAB | Facility: MEDICAL CENTER | Age: 45
End: 2020-11-24
Attending: INTERNAL MEDICINE
Payer: COMMERCIAL

## 2020-11-24 DIAGNOSIS — D68.61 ANTIPHOSPHOLIPID ANTIBODY SYNDROME (HCC): ICD-10-CM

## 2020-11-24 DIAGNOSIS — G45.9 TRANSIENT ISCHEMIC ATTACK: ICD-10-CM

## 2020-11-24 LAB
INR BLD: 2.4 (ref 0.9–1.2)
INR PPP: 2.4 (ref 2–3.5)

## 2020-11-24 PROCEDURE — 85610 PROTHROMBIN TIME: CPT

## 2020-11-24 PROCEDURE — 99211 OFF/OP EST MAY X REQ PHY/QHP: CPT

## 2020-11-24 NOTE — PROGRESS NOTES
OP Anticoagulation Service Note    Date: 2020  There were no vitals filed for this visit.   pt declined vitals    Anticoagulation Summary  As of 2020    INR goal:  2.0-3.0   TTR:  100.0 % (1.8 mo)   INR used for dosin.40 (2020)   Warfarin maintenance plan:  7.5 mg (5 mg x 1.5) every Mon; 5 mg (5 mg x 1) all other days   Weekly warfarin total:  37.5 mg   Plan last modified:  Lorena Whiting, PharmD (2020)   Next INR check:  2020   Target end date:  Indefinite    Indications    Antiphospholipid antibody syndrome (HCC) [D68.61]  Transient ischemic attack [G45.9]             Anticoagulation Episode Summary     INR check location:      Preferred lab:      Send INR reminders to:      Comments:        Anticoagulation Care Providers     Provider Role Specialty Phone number    Theron CRAVEN M.D. Referring Family Medicine [phone]    [facility]   [phone]        Anticoagulation Patient Findings      HPI:   Mahesh De Paz seen in clinic today, on anticoagulation therapy with warfarin (a high risk medication) for antiphospholipid syndrome and TIA       Pt is here today to evaluate anticoagulation therapy      Confirmed warfarin dosing regimen, denies missed or extra doses of coumadin.   Diet has been consistent with foods rich in vitamin K: Yes  Changes in ETOH:  No  Changes in smoking status: No  Changes in medication: No   Cost restriction: No  S/s of bleeding:  No  Signs/symptoms  thrombosis since the last appt: no    A/P   INR  therapeutic today  Continue current warfarin regimen        Pt educated to contact our clinic with any changes in medications or s/s of bleeding or thrombosis    Follow up appointment in 5 week(s) to reduce risk of adverse events from warfarin   Betsey Whittington, PharmD

## 2020-12-29 ENCOUNTER — ANTICOAGULATION VISIT (OUTPATIENT)
Dept: VASCULAR LAB | Facility: MEDICAL CENTER | Age: 45
End: 2020-12-29
Attending: INTERNAL MEDICINE
Payer: COMMERCIAL

## 2020-12-29 DIAGNOSIS — D68.61 ANTIPHOSPHOLIPID ANTIBODY SYNDROME (HCC): ICD-10-CM

## 2020-12-29 DIAGNOSIS — G45.9 TRANSIENT ISCHEMIC ATTACK: ICD-10-CM

## 2020-12-29 LAB
INR BLD: 1.3 (ref 0.9–1.2)
INR PPP: 1.3 (ref 2–3.5)

## 2020-12-29 PROCEDURE — 85610 PROTHROMBIN TIME: CPT

## 2020-12-29 PROCEDURE — 99212 OFFICE O/P EST SF 10 MIN: CPT

## 2020-12-29 NOTE — PROGRESS NOTES
Anticoagulation Summary  As of 2020    INR goal:  2.0-3.0   TTR:  75.0 % (3 mo)   INR used for dosin.30 (2020)   Warfarin maintenance plan:  7.5 mg (5 mg x 1.5) every Mon; 5 mg (5 mg x 1) all other days   Weekly warfarin total:  37.5 mg   Plan last modified:  Maegan CastilloD (2020)   Next INR check:  2020   Target end date:  Indefinite    Indications    Antiphospholipid antibody syndrome (HCC) [D68.61]  Transient ischemic attack [G45.9]             Anticoagulation Episode Summary     INR check location:      Preferred lab:      Send INR reminders to:      Comments:        Anticoagulation Care Providers     Provider Role Specialty Phone number    Therno CRAVEN M.D. Referring Family Medicine [phone]    [facility]   [phone]        Anticoagulation Patient Findings      HPI:  Mahesh Sun Baldev seen in clinic today, on anticoagulation therapy with warfarin for APS and hx of TIA  Changes to current medical/health status since last appt: None  Denies signs/symptoms of bleeding and/or thrombosis since the last appt.    Denies any interval changes to diet.  Denies any interval changes to medications since last appt.   Denies any complications or cost restrictions with current therapy.   BP declined.    A/P   INR  SUB-therapeutic. Pt states that he missed a couple of doses with the holiday.  Instructed pt to bolus w/ 10 mg today and 7.5 mg tomorrow.     Counseled pt that he is a candidate for Lovenox injections given his warfarin indication, but pt refused and stated that he would rather bolus w/ warfarin. Counseled pt on the risks of foregoing bridge therapy - pt expressed understanding and wished to proceed.    Follow up appointment in 2 day(s).    Edward Donnelly, MaeganD

## 2020-12-31 ENCOUNTER — ANTICOAGULATION VISIT (OUTPATIENT)
Dept: VASCULAR LAB | Facility: MEDICAL CENTER | Age: 45
End: 2020-12-31
Attending: INTERNAL MEDICINE
Payer: COMMERCIAL

## 2020-12-31 DIAGNOSIS — D68.61 ANTIPHOSPHOLIPID ANTIBODY SYNDROME (HCC): ICD-10-CM

## 2020-12-31 DIAGNOSIS — G45.9 TRANSIENT ISCHEMIC ATTACK: ICD-10-CM

## 2020-12-31 LAB
INR BLD: 2 (ref 0.9–1.2)
INR PPP: 2 (ref 2–3.5)

## 2020-12-31 PROCEDURE — 85610 PROTHROMBIN TIME: CPT

## 2020-12-31 PROCEDURE — 99211 OFF/OP EST MAY X REQ PHY/QHP: CPT

## 2020-12-31 NOTE — PROGRESS NOTES
Anticoagulation Summary  As of 2020    INR goal:  2.0-3.0   TTR:  73.3 % (3 mo)   INR used for dosin.00 (2020)   Warfarin maintenance plan:  7.5 mg (5 mg x 1.5) every Mon; 5 mg (5 mg x 1) all other days   Weekly warfarin total:  37.5 mg   Plan last modified:  Lorena Whiting, PharmD (2020)   Next INR check:  2021   Target end date:  Indefinite    Indications    Antiphospholipid antibody syndrome (HCC) [D68.61]  Transient ischemic attack [G45.9]             Anticoagulation Episode Summary     INR check location:      Preferred lab:      Send INR reminders to:      Comments:        Anticoagulation Care Providers     Provider Role Specialty Phone number    Theron CRAVEN M.D. Referring Family Medicine [phone]    [facility]   [phone]        Anticoagulation Patient Findings  Patient Findings     Negatives:  Signs/symptoms of thrombosis, Signs/symptoms of bleeding, Laboratory test error suspected, Change in health, Change in alcohol use, Change in activity, Upcoming invasive procedure, Emergency department visit, Upcoming dental procedure, Missed doses, Extra doses, Change in medications, Change in diet/appetite, Hospital admission, Bruising, Other complaints              History of Present Illness: follow up appointment for chronic anticoagulation with the high risk medication, warfarin for DVT    Last INR was out of range, dosage adjusted: pt is now at goal. Continue current dosing regimen.  Follow up in 3 weeks, to reduce the risk of adverse events related to this high risk medication, warfarin.    Stacey Buenrostro, Clinical Pharmacist

## 2021-01-16 DIAGNOSIS — D68.61 ANTIPHOSPHOLIPID ANTIBODY SYNDROME (HCC): ICD-10-CM

## 2021-01-18 RX ORDER — WARFARIN SODIUM 5 MG/1
TABLET ORAL
Qty: 90 TAB | Refills: 1 | Status: SHIPPED | OUTPATIENT
Start: 2021-01-18 | End: 2021-06-01

## 2021-01-19 ENCOUNTER — ANTICOAGULATION VISIT (OUTPATIENT)
Dept: VASCULAR LAB | Facility: MEDICAL CENTER | Age: 46
End: 2021-01-19
Attending: INTERNAL MEDICINE
Payer: COMMERCIAL

## 2021-01-19 DIAGNOSIS — D68.61 ANTIPHOSPHOLIPID ANTIBODY SYNDROME (HCC): ICD-10-CM

## 2021-01-19 DIAGNOSIS — G45.9 TRANSIENT ISCHEMIC ATTACK: ICD-10-CM

## 2021-01-19 LAB
INR BLD: 2.5 (ref 0.9–1.2)
INR PPP: 2.5 (ref 2–3.5)

## 2021-01-19 PROCEDURE — 85610 PROTHROMBIN TIME: CPT

## 2021-01-19 PROCEDURE — 99211 OFF/OP EST MAY X REQ PHY/QHP: CPT

## 2021-01-19 NOTE — PROGRESS NOTES
Anticoagulation Summary  As of 2021    INR goal:  2.0-3.0   TTR:  77.9 % (3.7 mo)   INR used for dosin.50 (2021)   Warfarin maintenance plan:  7.5 mg (5 mg x 1.5) every Mon; 5 mg (5 mg x 1) all other days   Weekly warfarin total:  37.5 mg   Plan last modified:  Lorena Whiting, PharmD (2020)   Next INR check:  2021   Target end date:  Indefinite    Indications    Antiphospholipid antibody syndrome (HCC) [D68.61]  Transient ischemic attack [G45.9]             Anticoagulation Episode Summary     INR check location:      Preferred lab:      Send INR reminders to:      Comments:        Anticoagulation Care Providers     Provider Role Specialty Phone number    Theron CRAVEN M.D. Referring Family Medicine [phone]    [facility]   [phone]             Anticoagulation Patient Findings  Patient Findings     Negatives:  Signs/symptoms of thrombosis, Signs/symptoms of bleeding, Laboratory test error suspected, Change in health, Change in alcohol use, Change in activity, Upcoming invasive procedure, Emergency department visit, Upcoming dental procedure, Missed doses, Extra doses, Change in medications, Change in diet/appetite, Hospital admission, Bruising, Other complaints          HPI:  Mahesh De Paz seen in clinic today, on anticoagulation therapy with warfarin for hx of TIA, antiphospholipid syndrome  Changes to current medical/health status since last appt: none  Denies signs/symptoms of bleeding and/or thrombosis since the last appt.    Denies any interval changes to diet  Denies any interval changes to medications since last appt.   Denies any complications or cost restrictions with current therapy.   Verified current warfarin dosing schedule.   BP declined d/t COVID-19 concerns.  Medications reconciled     A/P   INR  therapeutic.   Pt is to continue with current warfarin dosing regimen.    Follow up appointment in 4 week(s).    Rita Queen  PharmD

## 2021-02-16 ENCOUNTER — ANTICOAGULATION VISIT (OUTPATIENT)
Dept: VASCULAR LAB | Facility: MEDICAL CENTER | Age: 46
End: 2021-02-16
Attending: INTERNAL MEDICINE
Payer: COMMERCIAL

## 2021-02-16 DIAGNOSIS — D68.61 ANTIPHOSPHOLIPID ANTIBODY SYNDROME (HCC): ICD-10-CM

## 2021-02-16 DIAGNOSIS — G45.9 TRANSIENT ISCHEMIC ATTACK: ICD-10-CM

## 2021-02-16 LAB
INR BLD: 2.1 (ref 0.9–1.2)
INR PPP: 2.1 (ref 2–3.5)

## 2021-02-16 PROCEDURE — 99211 OFF/OP EST MAY X REQ PHY/QHP: CPT

## 2021-02-16 PROCEDURE — 85610 PROTHROMBIN TIME: CPT

## 2021-03-30 ENCOUNTER — ANTICOAGULATION VISIT (OUTPATIENT)
Dept: VASCULAR LAB | Facility: MEDICAL CENTER | Age: 46
End: 2021-03-30
Attending: INTERNAL MEDICINE
Payer: COMMERCIAL

## 2021-03-30 DIAGNOSIS — G45.9 TRANSIENT ISCHEMIC ATTACK: ICD-10-CM

## 2021-03-30 DIAGNOSIS — D68.61 ANTIPHOSPHOLIPID ANTIBODY SYNDROME (HCC): ICD-10-CM

## 2021-03-30 LAB — INR PPP: 2.4 (ref 2–3.5)

## 2021-03-30 PROCEDURE — 85610 PROTHROMBIN TIME: CPT

## 2021-03-30 PROCEDURE — 99211 OFF/OP EST MAY X REQ PHY/QHP: CPT

## 2021-03-30 NOTE — PROGRESS NOTES
Anticoagulation Summary  As of 3/30/2021    INR goal:  2.0-3.0   TTR:  86.5 % (6 mo)   INR used for dosin.40 (3/30/2021)   Warfarin maintenance plan:  7.5 mg (5 mg x 1.5) every Mon; 5 mg (5 mg x 1) all other days   Weekly warfarin total:  37.5 mg   Plan last modified:  Lorena Whiting PharmD (2020)   Next INR check:  2021   Target end date:  Indefinite    Indications    Antiphospholipid antibody syndrome (HCC) [D68.61]  Transient ischemic attack [G45.9]             Anticoagulation Episode Summary     INR check location:      Preferred lab:      Send INR reminders to:      Comments:        Anticoagulation Care Providers     Provider Role Specialty Phone number    Theron CRAVEN M.D. Referring Family Medicine [phone]    [facility] Anticoagulation Services   [phone]                Anticoagulation Patient Findings      HPI:  Mahesh De Paz seen in clinic today, on anticoagulation therapy with warfarin for h/o TIA; antiphospholipid syndrome  Changes to current medical/health status since last appt: DENIES  Denies signs/symptoms of bleeding and/or thrombosis since the last appt.    Denies any interval changes to diet  Denies any interval changes to medications since last appt.   Denies any complications or cost restrictions with current therapy.   Verified current warfarin dosing schedule.   BP declined  Medications reconciled   Pt NOT on antiplatelet therapy     A/P   INR  remains therapeutic.   Pt is to continue with current warfarin dosing regimen.        Follow up appointment in 6 week(s).    Chai Carreon, PharmD

## 2021-04-09 LAB — INR BLD: 2.4 (ref 0.9–1.2)

## 2021-05-11 ENCOUNTER — ANTICOAGULATION VISIT (OUTPATIENT)
Dept: VASCULAR LAB | Facility: MEDICAL CENTER | Age: 46
End: 2021-05-11
Attending: INTERNAL MEDICINE
Payer: COMMERCIAL

## 2021-05-11 DIAGNOSIS — D68.61 ANTIPHOSPHOLIPID ANTIBODY SYNDROME (HCC): ICD-10-CM

## 2021-05-11 DIAGNOSIS — G45.9 TRANSIENT ISCHEMIC ATTACK: ICD-10-CM

## 2021-05-11 LAB — INR PPP: 2.8 (ref 2–3.5)

## 2021-05-11 PROCEDURE — 99211 OFF/OP EST MAY X REQ PHY/QHP: CPT

## 2021-05-11 PROCEDURE — 85610 PROTHROMBIN TIME: CPT

## 2021-05-11 NOTE — PROGRESS NOTES
OP Anticoagulation Service Note    Date: 2021    Anticoagulation Summary  As of 2021    INR goal:  2.0-3.0   TTR:  89.1 % (7.4 mo)   INR used for dosin.80 (2021)   Warfarin maintenance plan:  7.5 mg (5 mg x 1.5) every Mon; 5 mg (5 mg x 1) all other days   Weekly warfarin total:  37.5 mg   Plan last modified:  Lorena Whiting, PharmD (2020)   Next INR check:  2021   Target end date:  Indefinite    Indications    Antiphospholipid antibody syndrome (HCC) [D68.61]  Transient ischemic attack [G45.9]             Anticoagulation Episode Summary     INR check location:      Preferred lab:      Send INR reminders to:      Comments:        Anticoagulation Care Providers     Provider Role Specialty Phone number    Theron CRAVEN M.D. Referring Family Medicine [phone]    [facility] Anticoagulation Services   [phone]        Anticoagulation Patient Findings      HPI:     Mahesh De Paz is in the Anticoagulation Clinic today for a INR check on their anticoagulation therapy.     The reason for today's visit is to prevent morbidity and mortality from a blood clot and/or stroke and to reduce the risk of bleeding while on a anticoagulant.     PCP:  Theron CRAVEN M.D.  [address] [facility] [id number]    3 vitals included with today's appt- patient declined, as he was off to work.   (BP, HR, weight, ht, RR)   There were no vitals filed for this visit.       Assessment:     • INR  therapeutic.   • Confirmed warfarin dosing regimen: Yes  • Interval Changes with foods rich in vitamin K: No  • Interval Changes in ETOH or cranberries:   No  • Interval Changes in smoking status:  No  • S/S of bleeding or bruising:  No  • Signs/symptoms  thrombosis since the last appt:  No  • Any upcoming procedures that require stopping warfarin and/or using bridge therapy: None  • Interval Changes in medication:  No   • Is pt on antiplatelet therapy: no  • Is pt on NSAID: no      Current  Outpatient Medications:   •  warfarin, TAKE WARFARIN AS SPECIFIED BY [facility] COUMADIN [facility] (TAKE 1 AND 1/2 TABLETS BY MOUTH ON MONDAY AND THEN TAKE 1 TABLET BY MOUTH ALL OTHER DAYS)    Plan:     • Continue the same warfarin dose, as noted above.       Follow-up:     • Test in 6 week(s).    • This decision was made using shared decision making with the pt and benefits vs risks were discussed to reduce Covid exposure.        Additional information discussed with patient:     • Pt educated to contact our clinic with any changes in medications or s/s of bleeding or thrombosis.  • Education was provided today regarding tips to reduce their bleed risk and dietary constraints while on a anticoagulant.    National recommendations regarding anticoagulation therapy:     The CHEST guidelines recommends frequent INR monitoring at regular intervals (a few days up to a max of 12 weeks) to ensure patients are on the proper dose of warfarin, and patients are not having any complications from therapy.  INRs can dramatically change over a short time period due to diet, medications, and medical conditions.       Chadwick Ospina, PharmD, MS, BCACP, LCC  [facility]  Phone [phone] fax [phone]    This note was created using voice recognition software (Dragon). The accuracy of the dictation is limited by the abilities of the software. I have reviewed the note prior to signing, however some errors in grammar and context are still possible. If you have any questions related to this note please do not hesitate to contact our office.

## 2021-06-01 DIAGNOSIS — D68.61 ANTIPHOSPHOLIPID ANTIBODY SYNDROME (HCC): ICD-10-CM

## 2021-06-01 RX ORDER — WARFARIN SODIUM 5 MG/1
TABLET ORAL
Qty: 135 TABLET | Refills: 1 | Status: SHIPPED | OUTPATIENT
Start: 2021-06-01 | End: 2021-06-22 | Stop reason: SDUPTHER

## 2021-06-22 ENCOUNTER — ANTICOAGULATION VISIT (OUTPATIENT)
Dept: VASCULAR LAB | Facility: MEDICAL CENTER | Age: 46
End: 2021-06-22
Attending: INTERNAL MEDICINE
Payer: COMMERCIAL

## 2021-06-22 DIAGNOSIS — G45.9 TRANSIENT ISCHEMIC ATTACK: ICD-10-CM

## 2021-06-22 DIAGNOSIS — D68.61 ANTIPHOSPHOLIPID ANTIBODY SYNDROME (HCC): ICD-10-CM

## 2021-06-22 LAB — INR PPP: 2.6 (ref 2–3.5)

## 2021-06-22 PROCEDURE — 85610 PROTHROMBIN TIME: CPT

## 2021-06-22 PROCEDURE — 99211 OFF/OP EST MAY X REQ PHY/QHP: CPT

## 2021-06-22 RX ORDER — WARFARIN SODIUM 5 MG/1
TABLET ORAL
Qty: 135 TABLET | Refills: 1 | Status: SHIPPED | OUTPATIENT
Start: 2021-06-22 | End: 2022-01-14 | Stop reason: SDUPTHER

## 2021-06-22 NOTE — PROGRESS NOTES
Anticoagulation Summary  As of 2021    INR goal:  2.0-3.0   TTR:  90.8 % (8.8 mo)   INR used for dosin.60 (2021)   Warfarin maintenance plan:  7.5 mg (5 mg x 1.5) every Mon; 5 mg (5 mg x 1) all other days   Weekly warfarin total:  37.5 mg   Plan last modified:  Lorena Whiting, PharmD (2020)   Next INR check:  8/10/2021   Target end date:  Indefinite    Indications    Antiphospholipid antibody syndrome (HCC) [D68.61]  Transient ischemic attack [G45.9]             Anticoagulation Episode Summary     INR check location:      Preferred lab:      Send INR reminders to:      Comments:        Anticoagulation Care Providers     Provider Role Specialty Phone number    Theron CRAVEN M.D. Referring Family Medicine [phone]    [facility] Anticoagulation Services   [phone]                Anticoagulation Patient Findings      HPI:  Mahesh De Paz seen in clinic today, on anticoagulation therapy with warfarin for antiphospholipid syndrome  Changes to current medical/health status since last appt: none  Denies signs/symptoms of bleeding and/or thrombosis since the last appt.    Denies any interval changes to diet  Denies any interval changes to medications since last appt.   Denies any complications or cost restrictions with current therapy.   Verified current warfarin dosing schedule.   BP declined today  Medications reconciled   Pt not on antiplatelet therapy     A/P   INR  remains therapeutic.   Pt is to continue with current warfarin dosing regimen.      Follow up appointment in 7 week(s).    Tiffany Robin, Pharmacy Intern  Rita Queen, MaeganD

## 2021-06-24 LAB — INR BLD: 2.6 (ref 0.9–1.2)

## 2021-08-10 ENCOUNTER — ANTICOAGULATION VISIT (OUTPATIENT)
Dept: VASCULAR LAB | Facility: MEDICAL CENTER | Age: 46
End: 2021-08-10
Attending: INTERNAL MEDICINE
Payer: COMMERCIAL

## 2021-08-10 DIAGNOSIS — D68.61 ANTIPHOSPHOLIPID ANTIBODY SYNDROME (HCC): ICD-10-CM

## 2021-08-10 DIAGNOSIS — G45.9 TRANSIENT ISCHEMIC ATTACK: ICD-10-CM

## 2021-08-10 LAB — INR PPP: 2.3 (ref 2–3.5)

## 2021-08-10 PROCEDURE — 85610 PROTHROMBIN TIME: CPT

## 2021-08-10 PROCEDURE — 99211 OFF/OP EST MAY X REQ PHY/QHP: CPT

## 2021-08-11 LAB — INR BLD: 2.3 (ref 0.9–1.2)

## 2021-09-08 DIAGNOSIS — Z79.01 CHRONIC ANTICOAGULATION: ICD-10-CM

## 2021-10-05 ENCOUNTER — ANTICOAGULATION VISIT (OUTPATIENT)
Dept: VASCULAR LAB | Facility: MEDICAL CENTER | Age: 46
End: 2021-10-05
Attending: INTERNAL MEDICINE
Payer: COMMERCIAL

## 2021-10-05 DIAGNOSIS — G45.9 TRANSIENT ISCHEMIC ATTACK: ICD-10-CM

## 2021-10-05 DIAGNOSIS — D68.61 ANTIPHOSPHOLIPID ANTIBODY SYNDROME (HCC): ICD-10-CM

## 2021-10-05 LAB
INR BLD: 2.2 (ref 0.9–1.2)
INR PPP: 2.2 (ref 2–3.5)

## 2021-10-05 PROCEDURE — 99211 OFF/OP EST MAY X REQ PHY/QHP: CPT

## 2021-10-05 PROCEDURE — 85610 PROTHROMBIN TIME: CPT

## 2021-10-05 NOTE — PROGRESS NOTES
Anticoagulation Summary  As of 10/5/2021    INR goal:  2.0-3.0   TTR:  93.4 % (1 y)   INR used for dosin.20 (10/5/2021)   Warfarin maintenance plan:  7.5 mg (5 mg x 1.5) every Mon; 5 mg (5 mg x 1) all other days   Weekly warfarin total:  37.5 mg   Plan last modified:  Maegan CastilloD (2020)   Next INR check:  2021   Target end date:  Indefinite    Indications    Antiphospholipid antibody syndrome (HCC) [D68.61]  Transient ischemic attack [G45.9]             Anticoagulation Episode Summary     INR check location:      Preferred lab:      Send INR reminders to:      Comments:        Anticoagulation Care Providers     Provider Role Specialty Phone number    Theron CRAVEN M.D. Referring Family Medicine [phone]    [facility]   [phone]                Refer to Patient Findings for HPI:  Patient Findings     Negatives:  Signs/symptoms of thrombosis, Signs/symptoms of bleeding, Laboratory test error suspected, Change in health, Change in alcohol use, Change in activity, Upcoming invasive procedure, Emergency department visit, Upcoming dental procedure, Missed doses, Extra doses, Change in medications, Change in diet/appetite, Hospital admission, Bruising, Other complaints          There were no vitals filed for this visit.   pt declined vitals    Verified current warfarin dosing schedule.    Medications reconciled   Pt is not on antiplatelet therapy      A/P   INR  therapeutic.     Warfarin dosing recommendation: Pt is to continue with current warfarin dosing regimen.    Pt educated to contact our clinic with any changes in medications or s/s of bleeding or thrombosis. Pt is aware to seek immediate medical attention for falls, head injury or deep cuts.    Follow up appointment in 9 week(s).    Rita Queen, PharmD

## 2021-12-07 ENCOUNTER — ANTICOAGULATION VISIT (OUTPATIENT)
Dept: VASCULAR LAB | Facility: MEDICAL CENTER | Age: 46
End: 2021-12-07
Attending: INTERNAL MEDICINE
Payer: COMMERCIAL

## 2021-12-07 VITALS — HEART RATE: 59 BPM | SYSTOLIC BLOOD PRESSURE: 126 MMHG | DIASTOLIC BLOOD PRESSURE: 80 MMHG

## 2021-12-07 DIAGNOSIS — G45.9 TRANSIENT ISCHEMIC ATTACK: ICD-10-CM

## 2021-12-07 DIAGNOSIS — D68.61 ANTIPHOSPHOLIPID ANTIBODY SYNDROME (HCC): ICD-10-CM

## 2021-12-07 LAB
INR BLD: 1.7 (ref 0.9–1.2)
INR PPP: 1.7 (ref 2–3.5)

## 2021-12-07 PROCEDURE — 85610 PROTHROMBIN TIME: CPT

## 2021-12-07 PROCEDURE — 99212 OFFICE O/P EST SF 10 MIN: CPT

## 2021-12-07 NOTE — PROGRESS NOTES
Anticoagulation Summary  As of 2021    INR goal:  2.0-3.0   TTR:  85.6 % (1.2 y)   INR used for dosin.70 (2021)   Warfarin maintenance plan:  7.5 mg (5 mg x 1.5) every Mon; 5 mg (5 mg x 1) all other days   Weekly warfarin total:  37.5 mg   Plan last modified:  Lorena Whiting, PharmD (2020)   Next INR check:  2022   Target end date:  Indefinite    Indications    Antiphospholipid antibody syndrome (HCC) [D68.61]  Transient ischemic attack [G45.9]             Anticoagulation Episode Summary     INR check location:      Preferred lab:      Send INR reminders to:      Comments:        Anticoagulation Care Providers     Provider Role Specialty Phone number    Theron CRAVEN M.D. Referring Family Medicine [phone]    [facility] Anticoagulation Services   [phone]      Refer to Patient Findings for HPI:  Patient Findings     Positives:  Missed doses    Negatives:  Signs/symptoms of thrombosis, Signs/symptoms of bleeding, Laboratory test error suspected, Change in health, Change in alcohol use, Change in activity, Upcoming invasive procedure, Emergency department visit, Upcoming dental procedure, Extra doses, Change in medications, Change in diet/appetite, Hospital admission, Bruising, Other complaints        Vitals:    21 0822   BP: 126/80   Pulse: (!) 59     Patient seen in clinic today for follow up on anticoagulation therapy with warfarin (a high risk medication) for hx of APLS and hx of TIA  Verified current warfarin dosing schedule.  Patient missed 2 doses of warfarin in the past week.    Medications reconciled   Pt is not on antiplatelet therapy      A/P   INR is SUB-therapeutic today at 1.7.     Warfarin dosing recommendation: Patient will bolus with 7.5mg TONIGHT, then will resume his current dosing regimen.   Patient will follow up again in 4 weeks (per pt preference).    Pt educated to contact our clinic with any changes in medications or s/s of bleeding or thrombosis. Pt  is aware to seek immediate medical attention for falls, head injury or deep cuts.    Follow up appointment in 4 week(s).    Next appt: Tues, Jan 4 @ 7:45am     Vasyl Carlisle PharmD

## 2022-01-04 ENCOUNTER — ANTICOAGULATION VISIT (OUTPATIENT)
Dept: VASCULAR LAB | Facility: MEDICAL CENTER | Age: 47
End: 2022-01-04
Attending: INTERNAL MEDICINE
Payer: COMMERCIAL

## 2022-01-04 DIAGNOSIS — G45.9 TRANSIENT ISCHEMIC ATTACK: ICD-10-CM

## 2022-01-04 DIAGNOSIS — D68.61 ANTIPHOSPHOLIPID ANTIBODY SYNDROME (HCC): ICD-10-CM

## 2022-01-04 LAB
INR BLD: 1.6 (ref 0.9–1.2)
INR PPP: 1.6 (ref 2–3.5)

## 2022-01-04 PROCEDURE — 85610 PROTHROMBIN TIME: CPT

## 2022-01-04 PROCEDURE — 99212 OFFICE O/P EST SF 10 MIN: CPT

## 2022-01-04 NOTE — PROGRESS NOTES
OP Anticoagulation Service Note    Date: 2022    Anticoagulation Summary  As of 2022    INR goal:  2.0-3.0   TTR:  80.4 % (1.3 y)   INR used for dosin.60 (2022)   Warfarin maintenance plan:  7.5 mg (5 mg x 1.5) every Mon; 5 mg (5 mg x 1) all other days   Weekly warfarin total:  37.5 mg   Plan last modified:  Lorena Whiting, PharmD (2020)   Next INR check:  2022   Target end date:  Indefinite    Indications    Antiphospholipid antibody syndrome (HCC) [D68.61]  Transient ischemic attack [G45.9]             Anticoagulation Episode Summary     INR check location:      Preferred lab:      Send INR reminders to:      Comments:        Anticoagulation Care Providers     Provider Role Specialty Phone number    Theron CRAVEN M.D. Referring Family Medicine [phone]    [facility] Anticoagulation Services   [phone]        Anticoagulation Patient Findings  Patient Findings     Positives:  Missed doses    Negatives:  Signs/symptoms of thrombosis, Signs/symptoms of bleeding, Laboratory test error suspected, Change in health, Change in alcohol use, Change in activity, Upcoming invasive procedure, Emergency department visit, Upcoming dental procedure, Extra doses, Change in medications, Change in diet/appetite, Hospital admission, Bruising, Other complaints          HPI:     Mahesh De Paz is in the Anticoagulation Clinic today.     The reason for today's visit is to prevent morbidity and mortality from a blood clot and/or stroke and to reduce the risk of bleeding while on a anticoagulant.     PCP:  Theron CRAVEN M.D.  [address] [facility] [id number] [phone]    No results found for: HBA1C   Lab Results   Component Value Date/Time    CHOLSTRLTOT 220 (H) 2018 07:35 AM     (H) 2016 08:08 AM    HDL 35 (A) 2016 08:08 AM    TRIGLYCERIDE 180 (H) 2016 08:08 AM       Lab Results   Component Value Date/Time    GLUCOSE 95 2019 09:04 AM     BUN 17 11/07/2019 09:04 AM    CREATININE 1.00 11/07/2019 09:04 AM     Lab Results   Component Value Date/Time    ALKPHOSPHAT 59 11/07/2019 09:04 AM    ASTSGOT 24 11/07/2019 09:04 AM    ALTSGPT 32 11/07/2019 09:04 AM    TBILIRUBIN 0.8 11/07/2019 09:04 AM    INR 1.60 (A) 01/04/2022 12:00 AM    ALBUMIN 4.6 11/07/2019 09:04 AM      Lab Results   Component Value Date/Time    RBC 5.23 11/07/2019 09:04 AM    HEMOGLOBIN 16.3 11/07/2019 09:04 AM    HEMATOCRIT 47.2 11/07/2019 09:04 AM    PLATELETCT 172 11/07/2019 09:04 AM      No results found for: MALBCRT, MICROALBUR    Current Outpatient Medications:   •  warfarin, TAKE ONE TO ONE AND ONE-HALF (1-1.5) TABLETS DAILY AS DIRECTED BY [facility] ANTICOAGULATION SERVICES    Assessment:     • INR  sub-therapeutic.   • Is pt on antiplatelet therapy: no  • Is pt on NSAID: no    3 vitals included with today's appt-unless patient declined:  (BP, weight, ht, RR)   There were no vitals filed for this visit.    Plan:     • 10mg today then continue the same warfarin dose, as noted above.   •     Follow-up:     • Test in 1 week(s).    • This decision was made using shared decision making with the pt and benefits vs risks were discussed.      Additional information discussed with patient:     • Pt educated to contact our clinic with any changes in medications or s/s of bleeding or thrombosis.  • Education was provided today regarding tips to reduce their bleed risk and dietary constraints while on a anticoagulant.    National recommendations regarding anticoagulation therapy:     The CHEST guidelines recommends frequent monitoring at regular intervals for any patient on a anticoagulant, to ensure the patient is on the proper dose, and to monitor that they are not having any complications from therapy.       Chadwick Ospina, PharmD, MS, BCACP, C  [facility]  Phone [phone] fax [phone]    This note was created using voice recognition software  (Jenni). The accuracy of the dictation is limited by the abilities of the software. I have reviewed the note prior to signing, however some errors in grammar and context are still possible. If you have any questions related to this note please do not hesitate to contact our office.

## 2022-01-14 ENCOUNTER — ANTICOAGULATION VISIT (OUTPATIENT)
Dept: VASCULAR LAB | Facility: MEDICAL CENTER | Age: 47
End: 2022-01-14
Attending: INTERNAL MEDICINE
Payer: COMMERCIAL

## 2022-01-14 DIAGNOSIS — G45.9 TRANSIENT ISCHEMIC ATTACK: ICD-10-CM

## 2022-01-14 DIAGNOSIS — D68.61 ANTIPHOSPHOLIPID ANTIBODY SYNDROME (HCC): ICD-10-CM

## 2022-01-14 LAB — INR PPP: 2.8 (ref 2–3.5)

## 2022-01-14 PROCEDURE — 99211 OFF/OP EST MAY X REQ PHY/QHP: CPT

## 2022-01-14 PROCEDURE — 85610 PROTHROMBIN TIME: CPT

## 2022-01-14 RX ORDER — WARFARIN SODIUM 5 MG/1
TABLET ORAL
Qty: 135 TABLET | Refills: 1 | Status: SHIPPED | OUTPATIENT
Start: 2022-01-14 | End: 2023-04-13

## 2022-01-14 NOTE — PROGRESS NOTES
Anticoagulation Summary  As of 2022    INR goal:  2.0-3.0   TTR:  80.1 % (1.3 y)   INR used for dosin.80 (2022)   Warfarin maintenance plan:  7.5 mg (5 mg x 1.5) every Mon; 5 mg (5 mg x 1) all other days   Weekly warfarin total:  37.5 mg   Plan last modified:  Lorena Whiting, PharmD (2020)   Next INR check:  2022   Target end date:  Indefinite    Indications    Antiphospholipid antibody syndrome (HCC) [D68.61]  Transient ischemic attack [G45.9]             Anticoagulation Episode Summary     INR check location:      Preferred lab:      Send INR reminders to:      Comments:        Anticoagulation Care Providers     Provider Role Specialty Phone number    Theron CRAVEN M.D. Referring Family Medicine [phone]    [facility] Anticoagulation Services   [phone]        Refer to Patient Findings for HPI:  Patient Findings     Negatives:  Signs/symptoms of thrombosis, Signs/symptoms of bleeding, Laboratory test error suspected, Change in health, Change in alcohol use, Change in activity, Upcoming invasive procedure, Emergency department visit, Upcoming dental procedure, Missed doses, Extra doses, Change in medications, Change in diet/appetite, Hospital admission, Bruising, Other complaints        There were no vitals filed for this visit.  The pt declined vitals    Patient seen in clinic today for follow up on anticoagulation therapy with warfarin (a high risk medication) for hx of APL and hx of TIA  Verified current warfarin dosing schedule.  Patient denies any missed doses of warfarin.    Medications reconciled   Pt is not on antiplatelet therapy      A/P   INR is therapeutic today at 2.8.     Warfarin dosing recommendation: Continue with the current dosing regimen and will follow up again in 4 weeks.     Pt educated to contact our clinic with any changes in medications or s/s of bleeding or thrombosis. Pt is aware to seek immediate medical attention for falls, head injury or deep  cuts.    Follow up appointment in 4 week(s).    Next appt: Tues, Feb 8 @ 7:45am    Vasyl Carlisle PharmD

## 2022-01-17 LAB — INR BLD: 2.8 (ref 0.9–1.2)

## 2022-02-08 ENCOUNTER — ANTICOAGULATION VISIT (OUTPATIENT)
Dept: VASCULAR LAB | Facility: MEDICAL CENTER | Age: 47
End: 2022-02-08
Attending: INTERNAL MEDICINE
Payer: COMMERCIAL

## 2022-02-08 DIAGNOSIS — G45.9 TRANSIENT ISCHEMIC ATTACK: ICD-10-CM

## 2022-02-08 DIAGNOSIS — D68.61 ANTIPHOSPHOLIPID ANTIBODY SYNDROME (HCC): ICD-10-CM

## 2022-02-08 LAB — INR PPP: 2.1 (ref 2–3.5)

## 2022-02-08 PROCEDURE — 85610 PROTHROMBIN TIME: CPT

## 2022-02-08 PROCEDURE — 99211 OFF/OP EST MAY X REQ PHY/QHP: CPT

## 2022-02-08 NOTE — PROGRESS NOTES
Anticoagulation Summary  As of 2022    INR goal:  2.0-3.0   TTR:  81.1 % (1.4 y)   INR used for dosin.10 (2022)   Warfarin maintenance plan:  7.5 mg (5 mg x 1.5) every Mon; 5 mg (5 mg x 1) all other days   Weekly warfarin total:  37.5 mg   Plan last modified:  Lorena Whiting, PharmD (2020)   Next INR check:  3/15/2022   Target end date:  Indefinite    Indications    Antiphospholipid antibody syndrome (HCC) [D68.61]  Transient ischemic attack [G45.9]             Anticoagulation Episode Summary     INR check location:      Preferred lab:      Send INR reminders to:      Comments:        Anticoagulation Care Providers     Provider Role Specialty Phone number    Theron CRAVEN M.D. Referring Family Medicine [phone]    [facility]   [phone]        Refer to Patient Findings for HPI:  Patient Findings     Negatives:  Signs/symptoms of thrombosis, Signs/symptoms of bleeding, Laboratory test error suspected, Change in health, Change in alcohol use, Change in activity, Upcoming invasive procedure, Emergency department visit, Upcoming dental procedure, Missed doses, Extra doses, Change in medications, Change in diet/appetite, Hospital admission, Bruising, Other complaints        There were no vitals filed for this visit.  The pt declined vitals today     Patient seen in clinic today for follow up on anticoagulation therapy with warfarin (a high risk medication) for hx of APLS and TIA.  Verified current warfarin dosing schedule.  Patient denies any missed doses of warfarin.    Medications reconciled   Pt is not on antiplatelet therapy      A/P   INR is therapeutic today at 2.1.     Warfarin dosing recommendation: Continue with the current dosing regimen.   Patient will follow up again in 5 weeks.     Pt educated to contact our clinic with any changes in medications or s/s of bleeding or thrombosis. Pt is aware to seek immediate medical attention for falls, head injury or  deep cuts.    Follow up appointment in 5 week(s).    Next appt: Tues, March 15 @ 7:45am     Vasyl Carlisle PharmD

## 2022-02-09 LAB — INR BLD: 2.1 (ref 0.9–1.2)

## 2022-03-15 ENCOUNTER — ANTICOAGULATION VISIT (OUTPATIENT)
Dept: VASCULAR LAB | Facility: MEDICAL CENTER | Age: 47
End: 2022-03-15
Attending: INTERNAL MEDICINE
Payer: COMMERCIAL

## 2022-03-15 DIAGNOSIS — G45.9 TRANSIENT ISCHEMIC ATTACK: ICD-10-CM

## 2022-03-15 DIAGNOSIS — D68.61 ANTIPHOSPHOLIPID ANTIBODY SYNDROME (HCC): ICD-10-CM

## 2022-03-15 LAB — INR PPP: 3.4 (ref 2–3.5)

## 2022-03-15 PROCEDURE — 99212 OFFICE O/P EST SF 10 MIN: CPT

## 2022-03-15 PROCEDURE — 85610 PROTHROMBIN TIME: CPT

## 2022-03-15 NOTE — PROGRESS NOTES
Anticoagulation Summary  As of 3/15/2022    INR goal:  2.0-3.0   TTR:  80.3 % (1.5 y)   INR used for dosing:  3.40 (3/15/2022)   Warfarin maintenance plan:  7.5 mg (5 mg x 1.5) every Mon; 5 mg (5 mg x 1) all other days   Weekly warfarin total:  37.5 mg   Plan last modified:  Lorena Whiting, PharmD (9/21/2020)   Next INR check:  4/5/2022   Target end date:  Indefinite    Indications    Antiphospholipid antibody syndrome (HCC) [D68.61]  Transient ischemic attack [G45.9]             Anticoagulation Episode Summary     INR check location:      Preferred lab:      Send INR reminders to:      Comments:        Anticoagulation Care Providers     Provider Role Specialty Phone number    Theron CRAVEN M.D. Referring Family Medicine [phone]    [facility]   [phone]        Refer to Patient Findings for HPI:  Patient Findings     Positives:  Change in diet/appetite (Will be trying to eat more greens)    Negatives:  Signs/symptoms of thrombosis, Signs/symptoms of bleeding, Laboratory test error suspected, Change in health, Change in alcohol use, Change in activity, Upcoming invasive procedure, Emergency department visit, Upcoming dental procedure, Missed doses, Extra doses, Change in medications, Hospital admission, Bruising, Other complaints        There were no vitals filed for this visit.  The pt declined vitals today     Patient seen in clinic today for follow up on anticoagulation therapy with warfarin (a high risk medication) for hx of APLS & TIA  Verified current warfarin dosing schedule.  Patient denies any missed doses of warfarin.    Medications reconciled   Pt is not on antiplatelet therapy      A/P   INR is SUPRA-therapeutic today at 3.4.     Warfarin dosing recommendation: Patient already took warfarin this morning, so will have him reduce dose to 2.5mg TOMORROW, then he will resume his current dosing regimen.   Patient will follow up again in 3 weeks.     Pt educated to contact  our clinic with any changes in medications or s/s of bleeding or thrombosis. Pt is aware to seek immediate medical attention for falls, head injury or deep cuts.    Follow up appointment in 3 week(s).    Next appt: Tues, April 5 @ 7:45am     Vasyl Carlisle PharmD

## 2022-03-17 LAB — INR BLD: 3.4 (ref 0.9–1.2)

## 2022-04-05 ENCOUNTER — ANTICOAGULATION VISIT (OUTPATIENT)
Dept: VASCULAR LAB | Facility: MEDICAL CENTER | Age: 47
End: 2022-04-05
Attending: INTERNAL MEDICINE
Payer: COMMERCIAL

## 2022-04-05 DIAGNOSIS — D68.61 ANTIPHOSPHOLIPID ANTIBODY SYNDROME (HCC): ICD-10-CM

## 2022-04-05 DIAGNOSIS — G45.9 TRANSIENT ISCHEMIC ATTACK: ICD-10-CM

## 2022-04-05 LAB
INR BLD: 2.1 (ref 0.9–1.2)
INR PPP: 2.1 (ref 2–3.5)

## 2022-04-05 PROCEDURE — 99211 OFF/OP EST MAY X REQ PHY/QHP: CPT

## 2022-04-05 PROCEDURE — 85610 PROTHROMBIN TIME: CPT

## 2022-04-05 NOTE — PROGRESS NOTES
Anticoagulation Summary  As of 2022    INR goal:  2.0-3.0   TTR:  79.9 % (1.5 y)   INR used for dosin.10 (2022)   Warfarin maintenance plan:  7.5 mg (5 mg x 1.5) every Mon; 5 mg (5 mg x 1) all other days   Weekly warfarin total:  37.5 mg   Plan last modified:  Maegan CastilloD (2020)   Next INR check:  5/3/2022   Target end date:  Indefinite    Indications    Antiphospholipid antibody syndrome (HCC) [D68.61]  Transient ischemic attack [G45.9]             Anticoagulation Episode Summary     INR check location:      Preferred lab:      Send INR reminders to:      Comments:        Anticoagulation Care Providers     Provider Role Specialty Phone number    Theron CRAVEN M.D. Referring Family Medicine [phone]    [facility]   [phone]                Refer to Patient Findings for HPI:  Patient Findings     Negatives:  Signs/symptoms of thrombosis, Signs/symptoms of bleeding, Laboratory test error suspected, Change in health, Change in alcohol use, Change in activity, Upcoming invasive procedure, Emergency department visit, Upcoming dental procedure, Missed doses, Extra doses, Change in medications, Change in diet/appetite, Hospital admission, Bruising, Other complaints          There were no vitals filed for this visit.  pt declined vitals    Verified current warfarin dosing schedule.    Medications reconciled   Pt is not on antiplatelet therapy      A/P   INR  is-therapeutic.     Warfarin dosing recommendation: Pt is to continue with current warfarin dosing regimen.    Pt educated to contact our clinic with any changes in medications or s/s of bleeding or thrombosis. Pt is aware to seek immediate medical attention for falls, head injury or deep cuts.    Follow up appointment in 4 week(s).    Nic Dodge, MaeganD

## 2022-05-03 ENCOUNTER — ANTICOAGULATION VISIT (OUTPATIENT)
Dept: VASCULAR LAB | Facility: MEDICAL CENTER | Age: 47
End: 2022-05-03
Attending: INTERNAL MEDICINE
Payer: COMMERCIAL

## 2022-05-03 DIAGNOSIS — G45.9 TRANSIENT ISCHEMIC ATTACK: ICD-10-CM

## 2022-05-03 DIAGNOSIS — D68.61 ANTIPHOSPHOLIPID ANTIBODY SYNDROME (HCC): ICD-10-CM

## 2022-05-03 LAB
INR BLD: 3.1 (ref 0.9–1.2)
INR PPP: 3.1 (ref 2–3.5)

## 2022-05-03 PROCEDURE — 85610 PROTHROMBIN TIME: CPT

## 2022-05-03 PROCEDURE — 99212 OFFICE O/P EST SF 10 MIN: CPT

## 2022-05-03 NOTE — PROGRESS NOTES
OP Anticoagulation Service Note    Date: 5/3/2022    Anticoagulation Summary  As of 5/3/2022    INR goal:  2.0-3.0   TTR:  80.4 % (1.6 y)   INR used for dosing:  3.10 (5/3/2022)   Warfarin maintenance plan:  7.5 mg (5 mg x 1.5) every Mon; 5 mg (5 mg x 1) all other days   Weekly warfarin total:  37.5 mg   Plan last modified:  Lorena Whiting, PharmD (9/21/2020)   Next INR check:  5/31/2022   Target end date:  Indefinite    Indications    Antiphospholipid antibody syndrome (HCC) [D68.61]  Transient ischemic attack [G45.9]             Anticoagulation Episode Summary     INR check location:      Preferred lab:      Send INR reminders to:      Comments:        Anticoagulation Care Providers     Provider Role Specialty Phone number    Theron CRAVEN M.D. Referring Family Medicine [phone]    [facility]   [phone]        Anticoagulation Patient Findings  Patient Findings     Negatives:  Signs/symptoms of thrombosis, Signs/symptoms of bleeding, Laboratory test error suspected, Change in health, Change in alcohol use, Change in activity, Upcoming invasive procedure, Emergency department visit, Upcoming dental procedure, Missed doses, Extra doses, Change in medications, Change in diet/appetite, Hospital admission, Bruising, Other complaints          HPI:     Mahesh De Paz is in the Anticoagulation Clinic today.     The reason for today's visit is to prevent morbidity and mortality from a blood clot and/or stroke and to reduce the risk of bleeding while on a anticoagulant.     PCP:  Theron CRAVEN M.D.  [id number] [facility] [id number] [phone]    No results found for: HBA1C   Lab Results   Component Value Date/Time    CHOLSTRLTOT 220 (H) 07/26/2018 07:35 AM     (H) 03/16/2016 08:08 AM    HDL 35 (A) 03/16/2016 08:08 AM    TRIGLYCERIDE 180 (H) 03/16/2016 08:08 AM       Lab Results   Component Value Date/Time    GLUCOSE 95 11/07/2019 09:04 AM    BUN 17  11/07/2019 09:04 AM    CREATININE 1.00 11/07/2019 09:04 AM     Lab Results   Component Value Date/Time    ALKPHOSPHAT 59 11/07/2019 09:04 AM    ASTSGOT 24 11/07/2019 09:04 AM    ALTSGPT 32 11/07/2019 09:04 AM    TBILIRUBIN 0.8 11/07/2019 09:04 AM    INR 3.10 05/03/2022 12:00 AM    ALBUMIN 4.6 11/07/2019 09:04 AM      Lab Results   Component Value Date/Time    RBC 5.23 11/07/2019 09:04 AM    HEMOGLOBIN 16.3 11/07/2019 09:04 AM    HEMATOCRIT 47.2 11/07/2019 09:04 AM    PLATELETCT 172 11/07/2019 09:04 AM      No results found for: MALBCRT, MICROALBUR    Current Outpatient Medications:   •  warfarin, TAKE ONE TO ONE AND ONE-HALF (1-1.5) TABLETS DAILY AS DIRECTED BY [facility]    Assessment:     INR  supra-therapeutic.     Is pt on antiplatelet therapy: no    Is pt on NSAID: no    3 vitals included with today's appt-unless patient declined:  (BP, weight, ht, RR)   There were no vitals filed for this visit.    Plan:     • 2.5mg tomorrow then continue the same warfarin dose, as noted above.       Follow-up:     • Test in 4 week(s).    • This decision was made using shared decision making with the pt and benefits vs risks were discussed.      Additional information discussed with patient:     • Pt educated to contact our clinic with any changes in medications or s/s of bleeding or thrombosis.  • Education was provided today regarding tips to reduce their bleed risk and dietary constraints while on a anticoagulant.    National recommendations regarding anticoagulation therapy:     The CHEST guidelines recommends frequent monitoring at regular intervals for any patient on a anticoagulant, to ensure the patient is on the proper dose, and to monitor that they are not having any complications from therapy.       Chadwick Ospina, PharmD, MS, BCACP, LCC  [facility]  Phone [phone] fax [phone]    This note was created using voice recognition software (Dragon). The  accuracy of the dictation is limited by the abilities of the software. I have reviewed the note prior to signing, however some errors in grammar and context are still possible. If you have any questions related to this note please do not hesitate to contact our office.

## 2022-05-31 ENCOUNTER — ANTICOAGULATION VISIT (OUTPATIENT)
Dept: VASCULAR LAB | Facility: MEDICAL CENTER | Age: 47
End: 2022-05-31
Attending: INTERNAL MEDICINE
Payer: COMMERCIAL

## 2022-05-31 VITALS
BODY MASS INDEX: 29.16 KG/M2 | DIASTOLIC BLOOD PRESSURE: 84 MMHG | HEART RATE: 55 BPM | HEIGHT: 78 IN | SYSTOLIC BLOOD PRESSURE: 137 MMHG | WEIGHT: 252 LBS

## 2022-05-31 DIAGNOSIS — G45.9 TRANSIENT ISCHEMIC ATTACK: ICD-10-CM

## 2022-05-31 DIAGNOSIS — D68.61 ANTIPHOSPHOLIPID ANTIBODY SYNDROME (HCC): ICD-10-CM

## 2022-05-31 LAB — INR PPP: 2.7 (ref 2–3.5)

## 2022-05-31 PROCEDURE — 85610 PROTHROMBIN TIME: CPT

## 2022-05-31 PROCEDURE — 99211 OFF/OP EST MAY X REQ PHY/QHP: CPT

## 2022-05-31 NOTE — PROGRESS NOTES
OP Anticoagulation Service Note    Date: 2022    Anticoagulation Summary  As of 2022    INR goal:  2.0-3.0   TTR:  80.1 % (1.7 y)   INR used for dosin.70 (2022)   Warfarin maintenance plan:  7.5 mg (5 mg x 1.5) every Mon; 5 mg (5 mg x 1) all other days   Weekly warfarin total:  37.5 mg   Plan last modified:  Lorena Whiting, PharmD (2020)   Next INR check:  2022   Target end date:  Indefinite    Indications    Antiphospholipid antibody syndrome (HCC) [D68.61]  Transient ischemic attack [G45.9]             Anticoagulation Episode Summary     INR check location:      Preferred lab:      Send INR reminders to:      Comments:        Anticoagulation Care Providers     Provider Role Specialty Phone number    Theron CRAVEN M.D. Referring Family Medicine [phone]    [facility]   [phone]        Anticoagulation Patient Findings  Patient Findings     Negatives:  Signs/symptoms of thrombosis, Signs/symptoms of bleeding, Laboratory test error suspected, Change in health, Change in alcohol use, Change in activity, Upcoming invasive procedure, Emergency department visit, Upcoming dental procedure, Missed doses, Extra doses, Change in medications, Change in diet/appetite, Hospital admission, Bruising, Other complaints          HPI:     Mahesh De Paz is in the Anticoagulation Clinic today.     The reason for today's visit is to prevent morbidity and mortality from a blood clot and/or stroke and to reduce the risk of bleeding while on a anticoagulant.     PCP:  Theron CRAVEN M.D.  [id number] [facility] [id number] [phone]    No results found for: HBA1C   Lab Results   Component Value Date/Time    CHOLSTRLTOT 220 (H) 2018 07:35 AM     (H) 2016 08:08 AM    HDL 35 (A) 2016 08:08 AM    TRIGLYCERIDE 180 (H) 2016 08:08 AM       Lab Results   Component Value Date/Time    GLUCOSE 95 2019 09:04 AM    BUN 17  "11/07/2019 09:04 AM    CREATININE 1.00 11/07/2019 09:04 AM     Lab Results   Component Value Date/Time    ALKPHOSPHAT 59 11/07/2019 09:04 AM    ASTSGOT 24 11/07/2019 09:04 AM    ALTSGPT 32 11/07/2019 09:04 AM    TBILIRUBIN 0.8 11/07/2019 09:04 AM    INR 2.70 05/31/2022 12:00 AM    ALBUMIN 4.6 11/07/2019 09:04 AM      Lab Results   Component Value Date/Time    RBC 5.23 11/07/2019 09:04 AM    HEMOGLOBIN 16.3 11/07/2019 09:04 AM    HEMATOCRIT 47.2 11/07/2019 09:04 AM    PLATELETCT 172 11/07/2019 09:04 AM      No results found for: MALBCRT, MICROALBUR    Current Outpatient Medications:   •  warfarin, TAKE ONE TO ONE AND ONE-HALF (1-1.5) TABLETS DAILY AS DIRECTED BY [facility]    Assessment:     INR  therapeutic.     Is pt on antiplatelet therapy: no  Is pt on NSAID: no    3 vitals included with today's appt-unless patient declined:  (BP, weight, ht, RR)   Vitals:    05/31/22 0747   BP: 137/84   Pulse: (!) 55   Weight: 114 kg (252 lb)   Height: 1.97 m (6' 5.56\")       Plan:     • Continue the same warfarin dose, as noted above.       Follow-up:     • Test in 4 week(s).    • This decision was made using shared decision making with the pt and benefits vs risks were discussed.      Additional information discussed with patient:     • Pt educated to contact our clinic with any changes in medications or s/s of bleeding or thrombosis.  • Education was provided today regarding tips to reduce their bleed risk and dietary constraints while on a anticoagulant.    National recommendations regarding anticoagulation therapy:     The CHEST guidelines recommends frequent monitoring at regular intervals for any patient on a anticoagulant, to ensure the patient is on the proper dose, and to monitor that they are not having any complications from therapy.       Chadwick Ospina, PharmD, MS, BCACP, LCC  [facility]  Phone [phone] fax [phone]    This note was created using " voice recognition software (Dragon). The accuracy of the dictation is limited by the abilities of the software. I have reviewed the note prior to signing, however some errors in grammar and context are still possible. If you have any questions related to this note please do not hesitate to contact our office.

## 2022-06-02 LAB — INR BLD: 2.7 (ref 0.9–1.2)

## 2022-06-28 ENCOUNTER — ANTICOAGULATION VISIT (OUTPATIENT)
Dept: VASCULAR LAB | Facility: MEDICAL CENTER | Age: 47
End: 2022-06-28
Attending: INTERNAL MEDICINE
Payer: COMMERCIAL

## 2022-06-28 VITALS
DIASTOLIC BLOOD PRESSURE: 68 MMHG | HEART RATE: 69 BPM | WEIGHT: 252 LBS | BODY MASS INDEX: 29.76 KG/M2 | SYSTOLIC BLOOD PRESSURE: 143 MMHG | HEIGHT: 77 IN

## 2022-06-28 DIAGNOSIS — D68.61 ANTIPHOSPHOLIPID ANTIBODY SYNDROME (HCC): ICD-10-CM

## 2022-06-28 DIAGNOSIS — G45.9 TRANSIENT ISCHEMIC ATTACK: ICD-10-CM

## 2022-06-28 LAB — INR PPP: 3.6 (ref 2–3.5)

## 2022-06-28 PROCEDURE — 85610 PROTHROMBIN TIME: CPT

## 2022-06-28 PROCEDURE — 99212 OFFICE O/P EST SF 10 MIN: CPT

## 2022-06-28 NOTE — PROGRESS NOTES
OP Anticoagulation Service Note    Date: 6/28/2022    Anticoagulation Summary  As of 6/28/2022    INR goal:  2.0-3.0   TTR:  78.0 % (1.7 y)   INR used for dosing:  3.60 (6/28/2022)   Warfarin maintenance plan:  7.5 mg (5 mg x 1.5) every Mon; 5 mg (5 mg x 1) all other days   Weekly warfarin total:  37.5 mg   Plan last modified:  Lorena Whiting, PharmD (9/21/2020)   Next INR check:  7/19/2022   Target end date:  Indefinite    Indications    Antiphospholipid antibody syndrome (HCC) [D68.61]  Transient ischemic attack [G45.9]             Anticoagulation Episode Summary     INR check location:  Anticoagulation Clinic    Preferred lab:      Send INR reminders to:      Comments:        Anticoagulation Care Providers     Provider Role Specialty Phone number    Theron CRAVEN M.D. Referring Family Medicine [phone]    [facility]   [phone]        Anticoagulation Patient Findings  Patient Findings     Positives:  Bruising    Negatives:  Signs/symptoms of thrombosis, Signs/symptoms of bleeding, Laboratory test error suspected, Change in health, Change in alcohol use, Change in activity, Upcoming invasive procedure, Emergency department visit, Upcoming dental procedure, Missed doses, Extra doses, Change in medications, Change in diet/appetite, Hospital admission, Other complaints          HPI:     Mahesh De Paz is in the Anticoagulation Clinic today.     The reason for today's visit is to prevent morbidity and mortality from a blood clot and/or stroke and to reduce the risk of bleeding while on a anticoagulant.     PCP:  Theron CRAVEN M.D.  [address] [facility] [id number] [phone]    No results found for: HBA1C   Lab Results   Component Value Date/Time    CHOLSTRLTOT 220 (H) 07/26/2018 07:35 AM     (H) 03/16/2016 08:08 AM    HDL 35 (A) 03/16/2016 08:08 AM    TRIGLYCERIDE 180 (H) 03/16/2016 08:08 AM       Lab Results   Component Value Date/Time    GLUCOSE 95  "11/07/2019 09:04 AM    BUN 17 11/07/2019 09:04 AM    CREATININE 1.00 11/07/2019 09:04 AM     Lab Results   Component Value Date/Time    ALKPHOSPHAT 59 11/07/2019 09:04 AM    ASTSGOT 24 11/07/2019 09:04 AM    ALTSGPT 32 11/07/2019 09:04 AM    TBILIRUBIN 0.8 11/07/2019 09:04 AM    INR 3.60 (A) 06/28/2022 12:00 AM    ALBUMIN 4.6 11/07/2019 09:04 AM      Lab Results   Component Value Date/Time    RBC 5.23 11/07/2019 09:04 AM    HEMOGLOBIN 16.3 11/07/2019 09:04 AM    HEMATOCRIT 47.2 11/07/2019 09:04 AM    PLATELETCT 172 11/07/2019 09:04 AM      No results found for: MALBCRT, MICROALBUR    Current Outpatient Medications:   •  warfarin, TAKE ONE TO ONE AND ONE-HALF (1-1.5) TABLETS DAILY AS DIRECTED BY [facility]ICO[facility]    Assessment:     INR  supra-therapeutic.     Is pt on antiplatelet therapy: no    Is pt on NSAID: no    3 vitals included with today's appt-unless patient declined:  (BP, weight, ht, RR)   Vitals:    06/28/22 0748   BP: (!) 143/68   Pulse: 69   Weight: 114 kg (252 lb)   Height: 1.943 m (6' 4.5\")       Plan:     • 2.5mg tomorrow then continue the same warfarin dose, as noted above.       Follow-up:     • Test in 3 week(s).    • This decision was made using shared decision making with the pt and benefits vs risks were discussed.      Additional information discussed with patient:     • Pt educated to contact our clinic with any changes in medications or s/s of bleeding or thrombosis.  • Education was provided today regarding tips to reduce their bleed risk and dietary constraints while on a anticoagulant.    National recommendations regarding anticoagulation therapy:     The CHEST guidelines recommends frequent monitoring at regular intervals for any patient on a anticoagulant, to ensure the patient is on the proper dose, and to monitor that they are not having any complications from therapy.       Chadwick Ospina, PharmD, MS, BCACP, LCC  [facility]  Phone " [phone] fax [phone]    This note was created using voice recognition software (Dragon). The accuracy of the dictation is limited by the abilities of the software. I have reviewed the note prior to signing, however some errors in grammar and context are still possible. If you have any questions related to this note please do not hesitate to contact our office.

## 2022-06-29 LAB — INR BLD: 3.6 (ref 0.9–1.2)

## 2022-07-19 ENCOUNTER — ANTICOAGULATION VISIT (OUTPATIENT)
Dept: VASCULAR LAB | Facility: MEDICAL CENTER | Age: 47
End: 2022-07-19
Attending: INTERNAL MEDICINE
Payer: COMMERCIAL

## 2022-07-19 VITALS — HEART RATE: 60 BPM | DIASTOLIC BLOOD PRESSURE: 83 MMHG | SYSTOLIC BLOOD PRESSURE: 124 MMHG

## 2022-07-19 DIAGNOSIS — G45.9 TRANSIENT ISCHEMIC ATTACK: ICD-10-CM

## 2022-07-19 DIAGNOSIS — D68.61 ANTIPHOSPHOLIPID ANTIBODY SYNDROME (HCC): ICD-10-CM

## 2022-07-19 LAB — INR PPP: 2.4 (ref 2–3.5)

## 2022-07-19 PROCEDURE — 85610 PROTHROMBIN TIME: CPT

## 2022-07-19 PROCEDURE — 99211 OFF/OP EST MAY X REQ PHY/QHP: CPT

## 2022-07-19 NOTE — PROGRESS NOTES
Anticoagulation Summary  As of 2022    INR goal:  2.0-3.0   TTR:  77.2 % (1.8 y)   INR used for dosin.40 (2022)   Warfarin maintenance plan:  7.5 mg (5 mg x 1.5) every Mon; 5 mg (5 mg x 1) all other days   Weekly warfarin total:  37.5 mg   Plan last modified:  Lorena Whiting, PharmD (2020)   Next INR check:  2022   Target end date:  Indefinite    Indications    Antiphospholipid antibody syndrome (HCC) [D68.61]  Transient ischemic attack [G45.9]             Anticoagulation Episode Summary     INR check location:  Anticoagulation Clinic    Preferred lab:      Send INR reminders to:      Comments:        Anticoagulation Care Providers     Provider Role Specialty Phone number    Theron CRAVEN M.D. Referring Family Medicine [phone]    [facility]   [phone]                Refer to Patient Findings for HPI:  Patient Findings     Negatives:  Signs/symptoms of thrombosis, Signs/symptoms of bleeding, Laboratory test error suspected, Change in health, Change in alcohol use, Change in activity, Upcoming invasive procedure, Emergency department visit, Upcoming dental procedure, Missed doses, Extra doses, Change in medications, Change in diet/appetite, Hospital admission, Bruising, Other complaints          Vitals:    22 0759   BP: 124/83   Pulse: 60       Verified current warfarin dosing schedule.    Medications reconciled   Pt is not on antiplatelet therapy      A/P   INR  therapeutic.     Warfarin dosing recommendation: Pt is to continue with current warfarin dosing regimen.      Pt educated to contact our clinic with any changes in medications or s/s of bleeding or thrombosis. Pt is aware to seek immediate medical attention for falls, head injury or deep cuts.    Follow up appointment in 4 week(s).    Zuleyma Albright, Pharmacy Intern    César Peña, PharmD

## 2022-07-20 LAB — INR BLD: 2.4 (ref 0.9–1.2)

## 2022-07-26 ENCOUNTER — HOSPITAL ENCOUNTER (OUTPATIENT)
Dept: LAB | Facility: MEDICAL CENTER | Age: 47
End: 2022-07-26
Attending: FAMILY MEDICINE
Payer: COMMERCIAL

## 2022-07-26 LAB
ALBUMIN SERPL BCP-MCNC: 4.6 G/DL (ref 3.2–4.9)
ALBUMIN/GLOB SERPL: 1.8 G/DL
ALP SERPL-CCNC: 69 U/L (ref 30–99)
ALT SERPL-CCNC: 38 U/L (ref 2–50)
ANION GAP SERPL CALC-SCNC: 12 MMOL/L (ref 7–16)
AST SERPL-CCNC: 24 U/L (ref 12–45)
BASOPHILS # BLD AUTO: 0.6 % (ref 0–1.8)
BASOPHILS # BLD: 0.03 K/UL (ref 0–0.12)
BILIRUB SERPL-MCNC: 0.7 MG/DL (ref 0.1–1.5)
BUN SERPL-MCNC: 14 MG/DL (ref 8–22)
CALCIUM SERPL-MCNC: 9.6 MG/DL (ref 8.5–10.5)
CHLORIDE SERPL-SCNC: 103 MMOL/L (ref 96–112)
CHOLEST SERPL-MCNC: 262 MG/DL (ref 100–199)
CO2 SERPL-SCNC: 22 MMOL/L (ref 20–33)
CREAT SERPL-MCNC: 0.93 MG/DL (ref 0.5–1.4)
EOSINOPHIL # BLD AUTO: 0.13 K/UL (ref 0–0.51)
EOSINOPHIL NFR BLD: 2.8 % (ref 0–6.9)
ERYTHROCYTE [DISTWIDTH] IN BLOOD BY AUTOMATED COUNT: 39.4 FL (ref 35.9–50)
EST. AVERAGE GLUCOSE BLD GHB EST-MCNC: 103 MG/DL
FASTING STATUS PATIENT QL REPORTED: NORMAL
GFR SERPLBLD CREATININE-BSD FMLA CKD-EPI: 102 ML/MIN/1.73 M 2
GLOBULIN SER CALC-MCNC: 2.5 G/DL (ref 1.9–3.5)
GLUCOSE SERPL-MCNC: 110 MG/DL (ref 65–99)
HBA1C MFR BLD: 5.2 % (ref 4–5.6)
HCT VFR BLD AUTO: 46.8 % (ref 42–52)
HDLC SERPL-MCNC: 42 MG/DL
HGB BLD-MCNC: 16.7 G/DL (ref 14–18)
IMM GRANULOCYTES # BLD AUTO: 0.02 K/UL (ref 0–0.11)
IMM GRANULOCYTES NFR BLD AUTO: 0.4 % (ref 0–0.9)
LDLC SERPL CALC-MCNC: 182 MG/DL
LYMPHOCYTES # BLD AUTO: 1.37 K/UL (ref 1–4.8)
LYMPHOCYTES NFR BLD: 29 % (ref 22–41)
MCH RBC QN AUTO: 31 PG (ref 27–33)
MCHC RBC AUTO-ENTMCNC: 35.7 G/DL (ref 33.7–35.3)
MCV RBC AUTO: 86.8 FL (ref 81.4–97.8)
MONOCYTES # BLD AUTO: 0.43 K/UL (ref 0–0.85)
MONOCYTES NFR BLD AUTO: 9.1 % (ref 0–13.4)
NEUTROPHILS # BLD AUTO: 2.74 K/UL (ref 1.82–7.42)
NEUTROPHILS NFR BLD: 58.1 % (ref 44–72)
NRBC # BLD AUTO: 0 K/UL
NRBC BLD-RTO: 0 /100 WBC
PLATELET # BLD AUTO: 165 K/UL (ref 164–446)
PMV BLD AUTO: 11.4 FL (ref 9–12.9)
POTASSIUM SERPL-SCNC: 3.7 MMOL/L (ref 3.6–5.5)
PROT SERPL-MCNC: 7.1 G/DL (ref 6–8.2)
RBC # BLD AUTO: 5.39 M/UL (ref 4.7–6.1)
SODIUM SERPL-SCNC: 137 MMOL/L (ref 135–145)
TRIGL SERPL-MCNC: 189 MG/DL (ref 0–149)
TSH SERPL DL<=0.005 MIU/L-ACNC: 3.1 UIU/ML (ref 0.38–5.33)
WBC # BLD AUTO: 4.7 K/UL (ref 4.8–10.8)

## 2022-07-26 PROCEDURE — 80061 LIPID PANEL: CPT

## 2022-07-26 PROCEDURE — 85025 COMPLETE CBC W/AUTO DIFF WBC: CPT

## 2022-07-26 PROCEDURE — 84443 ASSAY THYROID STIM HORMONE: CPT

## 2022-07-26 PROCEDURE — 36415 COLL VENOUS BLD VENIPUNCTURE: CPT

## 2022-07-26 PROCEDURE — 83036 HEMOGLOBIN GLYCOSYLATED A1C: CPT

## 2022-07-26 PROCEDURE — 80053 COMPREHEN METABOLIC PANEL: CPT

## 2022-08-16 ENCOUNTER — ANTICOAGULATION VISIT (OUTPATIENT)
Dept: VASCULAR LAB | Facility: MEDICAL CENTER | Age: 47
End: 2022-08-16
Attending: INTERNAL MEDICINE
Payer: COMMERCIAL

## 2022-08-16 VITALS — HEART RATE: 64 BPM | SYSTOLIC BLOOD PRESSURE: 138 MMHG | DIASTOLIC BLOOD PRESSURE: 83 MMHG

## 2022-08-16 DIAGNOSIS — D68.61 ANTIPHOSPHOLIPID ANTIBODY SYNDROME (HCC): ICD-10-CM

## 2022-08-16 DIAGNOSIS — G45.9 TRANSIENT ISCHEMIC ATTACK: ICD-10-CM

## 2022-08-16 LAB — INR PPP: 2.7 (ref 2–3.5)

## 2022-08-16 PROCEDURE — 85610 PROTHROMBIN TIME: CPT

## 2022-08-16 PROCEDURE — 99211 OFF/OP EST MAY X REQ PHY/QHP: CPT

## 2022-08-16 NOTE — PROGRESS NOTES
Anticoagulation Summary  As of 2022      INR goal:  2.0-3.0   TTR:  78.1 % (1.9 y)   INR used for dosin.70 (2022)   Warfarin maintenance plan:  7.5 mg (5 mg x 1.5) every Mon; 5 mg (5 mg x 1) all other days   Weekly warfarin total:  37.5 mg   Plan last modified:  Maegan CastilloD (2020)   Next INR check:  2022   Target end date:  Indefinite    Indications    Antiphospholipid antibody syndrome (HCC) [D68.61]  Transient ischemic attack [G45.9]                 Anticoagulation Episode Summary       INR check location:  Anticoagulation Clinic    Preferred lab:      Send INR reminders to:      Comments:            Anticoagulation Care Providers       Provider Role Specialty Phone number    Theron CRAVEN M.D. Referring Family Medicine [phone]    [facility]   [phone]                  Refer to Patient Findings for HPI:      There were no vitals filed for this visit.    Verified current warfarin dosing schedule.    Medications reconciled   Pt is not on antiplatelet therapy      A/P   INR  therapeutic.     Warfarin dosing recommendation: Pt is to continue with current warfarin dosing regimen.     Pt educated to contact our clinic with any changes in medications or s/s of bleeding or thrombosis. Pt is aware to seek immediate medical attention for falls, head injury or deep cuts.    Follow up appointment in 5 week(s).    César Peña, PharmD

## 2022-08-17 LAB — INR BLD: 2.7 (ref 0.9–1.2)

## 2022-09-16 DIAGNOSIS — Z79.01 CHRONIC ANTICOAGULATION: ICD-10-CM

## 2022-09-20 ENCOUNTER — ANTICOAGULATION VISIT (OUTPATIENT)
Dept: VASCULAR LAB | Facility: MEDICAL CENTER | Age: 47
End: 2022-09-20
Attending: INTERNAL MEDICINE
Payer: COMMERCIAL

## 2022-09-20 VITALS — HEART RATE: 61 BPM | DIASTOLIC BLOOD PRESSURE: 75 MMHG | SYSTOLIC BLOOD PRESSURE: 146 MMHG

## 2022-09-20 DIAGNOSIS — D68.61 ANTIPHOSPHOLIPID ANTIBODY SYNDROME (HCC): ICD-10-CM

## 2022-09-20 DIAGNOSIS — G45.9 TRANSIENT ISCHEMIC ATTACK: ICD-10-CM

## 2022-09-20 LAB
INR BLD: 2.4 (ref 0.9–1.2)
INR PPP: 2.4 (ref 2–3.5)

## 2022-09-20 PROCEDURE — 99211 OFF/OP EST MAY X REQ PHY/QHP: CPT

## 2022-09-20 PROCEDURE — 85610 PROTHROMBIN TIME: CPT

## 2022-09-20 NOTE — PROGRESS NOTES
Anticoagulation Summary  As of 2022      INR goal:  2.0-3.0   TTR:  79.2 % (2 y)   INR used for dosin.40 (2022)   Warfarin maintenance plan:  7.5 mg (5 mg x 1.5) every Mon; 5 mg (5 mg x 1) all other days   Weekly warfarin total:  37.5 mg   Plan last modified:  Maegan CastilloD (2020)   Next INR check:  2022   Target end date:  Indefinite    Indications    Antiphospholipid antibody syndrome (HCC) [D68.61]  Transient ischemic attack [G45.9]                 Anticoagulation Episode Summary       INR check location:  Anticoagulation Clinic    Preferred lab:      Send INR reminders to:      Comments:            Anticoagulation Care Providers       Provider Role Specialty Phone number    Theron CRAVEN M.D. Referring Family Medicine [phone]    [facility]   [phone]                  Refer to Patient Findings for HPI:  Patient Findings       Negatives:  Signs/symptoms of thrombosis, Signs/symptoms of bleeding, Laboratory test error suspected, Change in health, Change in alcohol use, Change in activity, Upcoming invasive procedure, Emergency department visit, Upcoming dental procedure, Missed doses, Extra doses, Change in medications, Change in diet/appetite, Hospital admission, Bruising, Other complaints            Vitals:    22 0745   BP: (!) 146/75   Pulse: 61   Patient just finished working out, declined repeat blood pressure.      Verified current warfarin dosing schedule.    Medications reconciled   Pt is not on antiplatelet therapy      A/P   INR  therapeutic.     Warfarin dosing recommendation: Continue current warfarin regimen as above without changes      Pt educated to contact our clinic with any changes in medications or s/s of bleeding or thrombosis. Pt is aware to seek immediate medical attention for falls, head injury or deep cuts.    Follow up appointment in 6 week(s).    Maegan LeighD

## 2022-11-11 ENCOUNTER — ANTICOAGULATION VISIT (OUTPATIENT)
Dept: VASCULAR LAB | Facility: MEDICAL CENTER | Age: 47
End: 2022-11-11
Attending: INTERNAL MEDICINE
Payer: COMMERCIAL

## 2022-11-11 DIAGNOSIS — D68.61 ANTIPHOSPHOLIPID ANTIBODY SYNDROME (HCC): ICD-10-CM

## 2022-11-11 DIAGNOSIS — G45.9 TRANSIENT ISCHEMIC ATTACK: ICD-10-CM

## 2022-11-11 LAB — INR PPP: 3.3 (ref 2–3.5)

## 2022-11-11 PROCEDURE — 99212 OFFICE O/P EST SF 10 MIN: CPT

## 2022-11-11 PROCEDURE — 85610 PROTHROMBIN TIME: CPT

## 2022-11-11 NOTE — PROGRESS NOTES
Anticoagulation Summary  As of 11/11/2022      INR goal:  2.0-3.0   TTR:  78.2 % (2.1 y)   INR used for dosing:  3.30 (11/11/2022)   Warfarin maintenance plan:  7.5 mg (5 mg x 1.5) every Mon; 5 mg (5 mg x 1) all other days; Starting 11/11/2022   Weekly warfarin total:  37.5 mg   Plan last modified:  Lorena Whiting, PharmD (9/21/2020)   Next INR check:  12/13/2022   Target end date:  Indefinite    Indications    Antiphospholipid antibody syndrome (HCC) [D68.61]  Transient ischemic attack [G45.9]                 Anticoagulation Episode Summary       INR check location:  Anticoagulation Clinic    Preferred lab:      Send INR reminders to:      Comments:            Anticoagulation Care Providers       Provider Role Specialty Phone number    Theron CRAVEN M.D. Referring Family Medicine [phone]    [facility]   [phone]                  Refer to Patient Findings for HPI:  Patient Findings       Positives:  Change in alcohol use (Pt had EtOH last night), Change in medications (Started simvastatin)    Negatives:  Signs/symptoms of thrombosis, Signs/symptoms of bleeding, Laboratory test error suspected, Change in health, Change in activity, Upcoming invasive procedure, Emergency department visit, Upcoming dental procedure, Missed doses, Extra doses, Change in diet/appetite, Hospital admission, Bruising, Other complaints            There were no vitals filed for this visit.    Verified current warfarin dosing schedule.    Medications reconciled   Pt is not on antiplatelet therapy      A/P   INR  SUPRA-therapeutic.     Warfarin dosing recommendation: Instructed pt to take a decreased dose of 2.5 x 1 tomorrow as he already took his dose today and then continue on w/ his current regimen.    Pt educated to contact our clinic with any changes in medications or s/s of bleeding or thrombosis. Pt is aware to seek immediate medical attention for falls, head injury or deep cuts.    Follow up  appointment in 4.5 week(s).    Edward Donnelly, PharmD, BCACP

## 2022-11-14 LAB — INR BLD: 3.3 (ref 0.9–1.2)

## 2022-12-13 ENCOUNTER — ANTICOAGULATION VISIT (OUTPATIENT)
Dept: VASCULAR LAB | Facility: MEDICAL CENTER | Age: 47
End: 2022-12-13
Attending: INTERNAL MEDICINE
Payer: COMMERCIAL

## 2022-12-13 DIAGNOSIS — G45.9 TRANSIENT ISCHEMIC ATTACK: ICD-10-CM

## 2022-12-13 DIAGNOSIS — D68.61 ANTIPHOSPHOLIPID ANTIBODY SYNDROME (HCC): ICD-10-CM

## 2022-12-13 LAB
INR BLD: 2 (ref 0.9–1.2)
INR PPP: 2 (ref 2–3.5)

## 2022-12-13 PROCEDURE — 99211 OFF/OP EST MAY X REQ PHY/QHP: CPT

## 2022-12-13 PROCEDURE — 85610 PROTHROMBIN TIME: CPT

## 2022-12-13 RX ORDER — ROSUVASTATIN CALCIUM 10 MG/1
10 TABLET, COATED ORAL
COMMUNITY
Start: 2022-10-18

## 2022-12-13 NOTE — PROGRESS NOTES
Anticoagulation Summary  As of 2022      INR goal:  2.0-3.0   TTR:  78.2 % (2.2 y)   INR used for dosin.00 (2022)   Warfarin maintenance plan:  7.5 mg (5 mg x 1.5) every Mon; 5 mg (5 mg x 1) all other days; Starting 2022   Weekly warfarin total:  37.5 mg   Plan last modified:  Lorena Whiting, PharmD (2020)   Next INR check:  2023   Target end date:  Indefinite    Indications    Antiphospholipid antibody syndrome (HCC) [D68.61]  Transient ischemic attack [G45.9]                 Anticoagulation Episode Summary       INR check location:  Anticoagulation Clinic    Preferred lab:      Send INR reminders to:      Comments:            Anticoagulation Care Providers       Provider Role Specialty Phone number    Theron CRAVEN M.D. Referring Family Medicine [phone]    [facility]   [phone]          Refer to Patient Findings for HPI:  Patient Findings       Negatives:  Signs/symptoms of thrombosis, Signs/symptoms of bleeding, Laboratory test error suspected, Change in health, Change in alcohol use, Change in activity, Upcoming invasive procedure, Emergency department visit, Upcoming dental procedure, Missed doses, Extra doses, Change in medications, Change in diet/appetite, Hospital admission, Bruising, Other complaints          There were no vitals filed for this visit.  The pt declined vitals today     Patient seen in clinic today for follow up on anticoagulation therapy with warfarin (a high risk medication) for hx of APLS and TIA  Verified current warfarin dosing schedule.  Patient denies any missed doses of warfarin.    Medications reconciled   Pt is not on antiplatelet therapy      A/P   INR is therapeutic today at 2.0.     Warfarin dosing recommendation: Continue with the current dosing regimen.     Pt educated to contact our clinic with any changes in medications or s/s of bleeding or thrombosis. Pt is aware to seek immediate medical attention for  falls, head injury or deep cuts.    Follow up appointment in 6 week(s).    Next appt: Tuesday, Jan 24 @ 7:45am    Vasyl Carlisle PharmD

## 2023-01-24 ENCOUNTER — ANTICOAGULATION VISIT (OUTPATIENT)
Dept: VASCULAR LAB | Facility: MEDICAL CENTER | Age: 48
End: 2023-01-24
Attending: INTERNAL MEDICINE
Payer: COMMERCIAL

## 2023-01-24 DIAGNOSIS — D68.61 ANTIPHOSPHOLIPID ANTIBODY SYNDROME (HCC): ICD-10-CM

## 2023-01-24 DIAGNOSIS — G45.9 TRANSIENT ISCHEMIC ATTACK: ICD-10-CM

## 2023-01-24 LAB — INR PPP: 2.1 (ref 2–3.5)

## 2023-01-24 PROCEDURE — 85610 PROTHROMBIN TIME: CPT

## 2023-01-24 PROCEDURE — 99211 OFF/OP EST MAY X REQ PHY/QHP: CPT

## 2023-01-24 NOTE — PROGRESS NOTES
Anticoagulation Summary  As of 2023      INR goal:  2.0-3.0   TTR:  79.3 % (2.3 y)   INR used for dosin.10 (2023)   Warfarin maintenance plan:  7.5 mg (5 mg x 1.5) every Mon; 5 mg (5 mg x 1) all other days   Weekly warfarin total:  37.5 mg   Plan last modified:  Maegan CastilloD (2020)   Next INR check:  3/7/2023   Target end date:  Indefinite    Indications    Antiphospholipid antibody syndrome (HCC) [D68.61]  Transient ischemic attack [G45.9]                 Anticoagulation Episode Summary       INR check location:  Anticoagulation Clinic    Preferred lab:      Send INR reminders to:      Comments:            Anticoagulation Care Providers       Provider Role Specialty Phone number    Theron CRAVEN M.D. Referring Family Medicine [phone]    [facility]   [phone]                  Refer to Patient Findings for HPI:       pt declined vitals due to his many jackets.     Verified current warfarin dosing schedule.    Medications reconciled   Pt is not on antiplatelet therapy      A/P   INR  therapeutic.     Warfarin dosing recommendation: Pt is to continue with current warfarin dosing regimen.     Pt educated to contact our clinic with any changes in medications or s/s of bleeding or thrombosis. Pt is aware to seek immediate medical attention for falls, head injury or deep cuts.    Follow up appointment in 6 week(s).    César Peña, PharmD

## 2023-01-26 LAB — INR BLD: 2.1 (ref 0.9–1.2)

## 2023-03-08 ENCOUNTER — TELEPHONE (OUTPATIENT)
Dept: VASCULAR LAB | Facility: MEDICAL CENTER | Age: 48
End: 2023-03-08
Payer: COMMERCIAL

## 2023-03-08 NOTE — TELEPHONE ENCOUNTER
[facility]    Pt missed their last appointment. Left VM for pt to reschedule.    César Peña, PharmD

## 2023-03-24 ENCOUNTER — ANTICOAGULATION VISIT (OUTPATIENT)
Dept: VASCULAR LAB | Facility: MEDICAL CENTER | Age: 48
End: 2023-03-24
Attending: INTERNAL MEDICINE
Payer: COMMERCIAL

## 2023-03-24 DIAGNOSIS — D68.61 ANTIPHOSPHOLIPID ANTIBODY SYNDROME (HCC): ICD-10-CM

## 2023-03-24 DIAGNOSIS — G45.9 TRANSIENT ISCHEMIC ATTACK: ICD-10-CM

## 2023-03-24 LAB — INR PPP: 4.2 (ref 2–3.5)

## 2023-03-24 PROCEDURE — 85610 PROTHROMBIN TIME: CPT

## 2023-03-24 PROCEDURE — 99212 OFFICE O/P EST SF 10 MIN: CPT

## 2023-03-24 NOTE — PROGRESS NOTES
Anticoagulation Summary  As of 3/24/2023      INR goal:  2.0-3.0   TTR:  76.9 % (2.5 y)   INR used for dosin.20 (3/24/2023)   Warfarin maintenance plan:  7.5 mg (5 mg x 1.5) every Mon; 5 mg (5 mg x 1) all other days   Weekly warfarin total:  37.5 mg   Plan last modified:  Maegan CastilloD (2020)   Next INR check:  2023   Target end date:  Indefinite    Indications    Antiphospholipid antibody syndrome (HCC) [D68.61]  Transient ischemic attack [G45.9]                 Anticoagulation Episode Summary       INR check location:  Anticoagulation Clinic    Preferred lab:      Send INR reminders to:      Comments:            Anticoagulation Care Providers       Provider Role Specialty Phone number    Theron CRAVEN M.D. Referring Family Medicine [phone]    [facility]   [phone]                  Refer to Patient Findings for HPI:  Patient Findings       Positives:  Change in alcohol use (Pt had a couple of beers last night.)    Negatives:  Signs/symptoms of thrombosis, Signs/symptoms of bleeding, Laboratory test error suspected, Change in health, Change in activity, Upcoming invasive procedure, Emergency department visit, Upcoming dental procedure, Missed doses, Extra doses, Change in medications, Change in diet/appetite, Hospital admission, Bruising, Other complaints            There were no vitals filed for this visit.    Verified current warfarin dosing schedule.    Medications reconciled   Pt is not on antiplatelet therapy      A/P   INR  SUPRA-therapeutic.     Warfarin dosing recommendation: Instructed pt to HOLD x 1 dose tomorrow (already took his dose this AM) and to then continue on w/ his current regimen.    Pt educated to contact our clinic with any changes in medications or s/s of bleeding or thrombosis. Pt is aware to seek immediate medical attention for falls, head injury or deep cuts.    Follow up appointment in 1.5 week(s).    Maegan MosqueraD,  BCACP

## 2023-04-04 ENCOUNTER — ANTICOAGULATION VISIT (OUTPATIENT)
Dept: VASCULAR LAB | Facility: MEDICAL CENTER | Age: 48
End: 2023-04-04
Attending: INTERNAL MEDICINE
Payer: COMMERCIAL

## 2023-04-04 DIAGNOSIS — D68.61 ANTIPHOSPHOLIPID ANTIBODY SYNDROME (HCC): ICD-10-CM

## 2023-04-04 DIAGNOSIS — G45.9 TRANSIENT ISCHEMIC ATTACK: ICD-10-CM

## 2023-04-04 LAB — INR PPP: 1.6 (ref 2–3.5)

## 2023-04-04 PROCEDURE — 99212 OFFICE O/P EST SF 10 MIN: CPT

## 2023-04-04 PROCEDURE — 85610 PROTHROMBIN TIME: CPT

## 2023-04-04 NOTE — PROGRESS NOTES
Anticoagulation Summary  As of 2023      INR goal:  2.0-3.0   TTR:  76.4 % (2.5 y)   INR used for dosin.60 (2023)   Warfarin maintenance plan:  7.5 mg (5 mg x 1.5) every Mon; 5 mg (5 mg x 1) all other days   Weekly warfarin total:  37.5 mg   Plan last modified:  Maegan CastilloD (2020)   Next INR check:  2023   Target end date:  Indefinite    Indications    Antiphospholipid antibody syndrome (HCC) [D68.61]  Transient ischemic attack [G45.9]                 Anticoagulation Episode Summary       INR check location:  Anticoagulation Clinic    Preferred lab:      Send INR reminders to:      Comments:            Anticoagulation Care Providers       Provider Role Specialty Phone number    Theron CRAVEN M.D. Referring Family Medicine [phone]    [facility]   [phone]                  Refer to Patient Findings for HPI:  Patient Findings       Positives:  Missed doses    Negatives:  Signs/symptoms of thrombosis, Signs/symptoms of bleeding, Laboratory test error suspected, Change in health, Change in alcohol use, Change in activity, Upcoming invasive procedure, Emergency department visit, Upcoming dental procedure, Extra doses, Change in medications, Change in diet/appetite, Hospital admission, Bruising, Other complaints            There were no vitals filed for this visit.    Verified current warfarin dosing schedule.    Medications reconciled   Pt is not on antiplatelet therapy      A/P   INR  sub-therapeutic.     Warfarin dosing recommendation: Bolus with 7.5mg x 1 day, then continue weekly regimen    Pt educated to contact our clinic with any changes in medications or s/s of bleeding or thrombosis. Pt is aware to seek immediate medical attention for falls, head injury or deep cuts.    Follow up appointment in 2 week(s).    Rita Ventura, MaeganD

## 2023-04-18 ENCOUNTER — ANTICOAGULATION VISIT (OUTPATIENT)
Dept: VASCULAR LAB | Facility: MEDICAL CENTER | Age: 48
End: 2023-04-18
Attending: INTERNAL MEDICINE
Payer: COMMERCIAL

## 2023-04-18 DIAGNOSIS — G45.9 TRANSIENT ISCHEMIC ATTACK: ICD-10-CM

## 2023-04-18 DIAGNOSIS — D68.61 ANTIPHOSPHOLIPID ANTIBODY SYNDROME (HCC): ICD-10-CM

## 2023-04-18 LAB — INR PPP: 3.6 (ref 2–3.5)

## 2023-04-18 PROCEDURE — 85610 PROTHROMBIN TIME: CPT

## 2023-04-18 PROCEDURE — 99212 OFFICE O/P EST SF 10 MIN: CPT

## 2023-04-18 NOTE — PROGRESS NOTES
Anticoagulation Summary  As of 4/18/2023      INR goal:  2.0-3.0   TTR:  75.9 % (2.5 y)   INR used for dosing:  3.60 (4/18/2023)   Warfarin maintenance plan:  7.5 mg (5 mg x 1.5) every Mon; 5 mg (5 mg x 1) all other days   Weekly warfarin total:  37.5 mg   Plan last modified:  Lorena Whiting, PharmD (9/21/2020)   Next INR check:  5/2/2023   Target end date:  Indefinite    Indications    Antiphospholipid antibody syndrome (HCC) [D68.61]  Transient ischemic attack [G45.9]                 Anticoagulation Episode Summary       INR check location:  Anticoagulation Clinic    Preferred lab:      Send INR reminders to:      Comments:            Anticoagulation Care Providers       Provider Role Specialty Phone number    Theron CRAVEN M.D. Referring Family Medicine [phone]    [facility]   [phone]           Refer to Patient Findings for HPI:  Patient Findings       Positives:  Change in diet/appetite (less greens than usual)    Negatives:  Signs/symptoms of thrombosis, Signs/symptoms of bleeding, Laboratory test error suspected, Change in health, Change in alcohol use, Change in activity, Upcoming invasive procedure, Emergency department visit, Upcoming dental procedure, Missed doses, Extra doses, Change in medications, Hospital admission, Bruising, Other complaints          There were no vitals filed for this visit.  The pt declined vitals today     Patient seen in clinic today for follow up on anticoagulation therapy with warfarin (a high risk medication) for hx of APL and TIA  Verified current warfarin dosing schedule.  Patient denies any missed doses of warfarin.    Medications reconciled   Pt is not on antiplatelet therapy      A/P   INR is SUPRA-therapeutic today at 3.6.     Warfarin dosing recommendation: Patient instructed to reduce dose to 2.5mg TONIGHT, as a one time adjustment and then to resume his normal dosing as well as normal green intake.   Patient will follow up again  in 2 weeks.     Pt educated to contact our clinic with any changes in medications or s/s of bleeding or thrombosis. Pt is aware to seek immediate medical attention for falls, head injury or deep cuts.    Follow up appointment in 2 week(s).    Next appt: Tues, May 2 @ 7:30am     Vasyl Carlisle PharmD

## 2023-05-02 ENCOUNTER — ANTICOAGULATION VISIT (OUTPATIENT)
Dept: VASCULAR LAB | Facility: MEDICAL CENTER | Age: 48
End: 2023-05-02
Attending: INTERNAL MEDICINE
Payer: COMMERCIAL

## 2023-05-02 DIAGNOSIS — G45.9 TRANSIENT ISCHEMIC ATTACK: ICD-10-CM

## 2023-05-02 DIAGNOSIS — D68.61 ANTIPHOSPHOLIPID ANTIBODY SYNDROME (HCC): ICD-10-CM

## 2023-05-02 LAB — INR PPP: 2.8 (ref 2–3.5)

## 2023-05-02 PROCEDURE — 85610 PROTHROMBIN TIME: CPT

## 2023-05-02 PROCEDURE — 99211 OFF/OP EST MAY X REQ PHY/QHP: CPT

## 2023-05-02 NOTE — PROGRESS NOTES
Anticoagulation Summary  As of 2023      INR goal:  2.0-3.0   TTR:  75.2 % (2.6 y)   INR used for dosin.80 (2023)   Warfarin maintenance plan:  7.5 mg (5 mg x 1.5) every Mon; 5 mg (5 mg x 1) all other days   Weekly warfarin total:  37.5 mg   Plan last modified:  Lorena Whiting, PharmD (2020)   Next INR check:  2023   Target end date:  Indefinite    Indications    Antiphospholipid antibody syndrome (HCC) [D68.61]  Transient ischemic attack [G45.9]                 Anticoagulation Episode Summary       INR check location:  Anticoagulation Clinic    Preferred lab:      Send INR reminders to:      Comments:            Anticoagulation Care Providers       Provider Role Specialty Phone number    Theron CRAVEN M.D. Referring Family Medicine [phone]    [facility]   [phone]          Refer to Patient Findings for HPI:  Patient Findings       Negatives:  Signs/symptoms of thrombosis, Signs/symptoms of bleeding, Laboratory test error suspected, Change in health, Change in alcohol use, Change in activity, Upcoming invasive procedure, Emergency department visit, Upcoming dental procedure, Missed doses, Extra doses, Change in medications, Change in diet/appetite, Hospital admission, Bruising, Other complaints          There were no vitals filed for this visit.  The pt declined vitals today     Patient seen in clinic today for follow up on anticoagulation therapy with warfarin (a high risk medication) for hx of APLS and TIA  Verified current warfarin dosing schedule.  Patient denies any missed doses of warfarin.    Medications reconciled   Pt is not on antiplatelet therapy      A/P   INR is therapeutic today at 2.8.     Warfarin dosing recommendation: Continue with the current dosing regimen.   Patient will follow up again in 3 weeks.     Pt educated to contact our clinic with any changes in medications or s/s of bleeding or thrombosis. Pt is aware to seek immediate  medical attention for falls, head injury or deep cuts.    Follow up appointment in 3 week(s).    Next appt: Tuesday, May 23 @ 7:45am     Vasyl Carlisle PharmD

## 2023-05-23 ENCOUNTER — ANTICOAGULATION VISIT (OUTPATIENT)
Dept: VASCULAR LAB | Facility: MEDICAL CENTER | Age: 48
End: 2023-05-23
Attending: INTERNAL MEDICINE
Payer: COMMERCIAL

## 2023-05-23 DIAGNOSIS — G45.9 TRANSIENT ISCHEMIC ATTACK: ICD-10-CM

## 2023-05-23 DIAGNOSIS — D68.61 ANTIPHOSPHOLIPID ANTIBODY SYNDROME (HCC): ICD-10-CM

## 2023-05-23 LAB — INR PPP: 3.6 (ref 2–3.5)

## 2023-05-23 PROCEDURE — 85610 PROTHROMBIN TIME: CPT

## 2023-05-23 PROCEDURE — 99212 OFFICE O/P EST SF 10 MIN: CPT

## 2023-05-23 NOTE — PROGRESS NOTES
Anticoagulation Summary  As of 5/23/2023      INR goal:  2.0-3.0   TTR:  74.1 % (2.6 y)   INR used for dosing:  3.60 (5/23/2023)   Warfarin maintenance plan:  5 mg (5 mg x 1) every day   Weekly warfarin total:  35 mg   Plan last modified:  César Peña PharmD (5/23/2023)   Next INR check:  6/6/2023   Target end date:  Indefinite    Indications    Antiphospholipid antibody syndrome (HCC) [D68.61]  Transient ischemic attack [G45.9]                 Anticoagulation Episode Summary       INR check location:  Anticoagulation Clinic    Preferred lab:      Send INR reminders to:      Comments:            Anticoagulation Care Providers       Provider Role Specialty Phone number    Theron CRAVEN M.D. Referring Family Medicine [phone]    [facility]   [phone]                  Refer to Patient Findings for HPI:       pt declined vitals    Verified current warfarin dosing schedule.    Medications reconciled No-pt had to leave.       A/P   INR  SUPRA-therapeutic.     Warfarin dosing recommendation: Reduce warfarin today then begin reduced regimen.     Pt educated to contact our clinic with any changes in medications or s/s of bleeding or thrombosis. Pt is aware to seek immediate medical attention for falls, head injury or deep cuts.    Follow up appointment in 2 week(s).    César Peña, PharmD

## 2023-06-05 ENCOUNTER — ANTICOAGULATION VISIT (OUTPATIENT)
Dept: VASCULAR LAB | Facility: MEDICAL CENTER | Age: 48
End: 2023-06-05
Attending: INTERNAL MEDICINE
Payer: COMMERCIAL

## 2023-06-05 VITALS — DIASTOLIC BLOOD PRESSURE: 77 MMHG | SYSTOLIC BLOOD PRESSURE: 142 MMHG

## 2023-06-05 DIAGNOSIS — D68.69 SECONDARY HYPERCOAGULABLE STATE (HCC): ICD-10-CM

## 2023-06-05 DIAGNOSIS — G45.9 TRANSIENT ISCHEMIC ATTACK: ICD-10-CM

## 2023-06-05 DIAGNOSIS — D68.61 ANTIPHOSPHOLIPID ANTIBODY SYNDROME (HCC): ICD-10-CM

## 2023-06-05 DIAGNOSIS — I82.90 DEEP VEIN THROMBOSIS (DVT) OF NON-EXTREMITY VEIN, UNSPECIFIED CHRONICITY: ICD-10-CM

## 2023-06-05 LAB — INR PPP: 4.2 (ref 2–3.5)

## 2023-06-05 PROCEDURE — G0463 HOSPITAL OUTPT CLINIC VISIT: HCPCS | Performed by: NURSE PRACTITIONER

## 2023-06-05 PROCEDURE — 99212 OFFICE O/P EST SF 10 MIN: CPT | Performed by: NURSE PRACTITIONER

## 2023-06-05 PROCEDURE — 85610 PROTHROMBIN TIME: CPT

## 2023-06-05 NOTE — PROGRESS NOTES
Anticoagulation Summary  As of 2023      INR goal:  2.0-3.0   TTR:  73.1 % (2.7 y)   INR used for dosin.20 (2023)   Warfarin maintenance plan:  5 mg (5 mg x 1) every day   Weekly warfarin total:  35 mg   Plan last modified:  César Peña, PharmD (2023)   Next INR check:  2023   Target end date:  Indefinite    Indications    Antiphospholipid antibody syndrome (HCC) [D68.61]  Transient ischemic attack [G45.9]                 Anticoagulation Episode Summary       INR check location:  Anticoagulation Clinic    Preferred lab:      Send INR reminders to:      Comments:            Anticoagulation Care Providers       Provider Role Specialty Phone number    Theron CRAVEN M.D. Referring Family Medicine [phone]    [facility]   [phone]                  Refer to Patient Findings for HPI:  Patient Findings       Positives:  Change in alcohol use    Negatives:  Signs/symptoms of thrombosis, Signs/symptoms of bleeding, Laboratory test error suspected, Change in health, Change in activity, Upcoming invasive procedure, Emergency department visit, Upcoming dental procedure, Missed doses, Extra doses, Change in medications, Change in diet/appetite, Hospital admission, Bruising, Other complaints    Comments:  He had alcohol yesterday which he only consumes occasionally. Doesn't eat greens and stays consistent with mostly avoiding. Hasn't taken any NSAIDs.            Vitals:    23 0743   BP: (!) 142/77     Verified current warfarin dosing schedule.    Medications reconciled   Pt is not on antiplatelet therapy      A/P   INR  supra-therapeutic. INR trending high. Today's INR likely elevated from ETOH yesterday. With shared decision-making, we will do a one time dose hold then resume previous regimen.    Warfarin dosing recommendation: HOLD tonight's dose then resume 5 mg daily.    Pt educated to contact our clinic with any changes in medications or s/s of bleeding or  thrombosis. Pt is aware to seek immediate medical attention for falls, head injury or deep cuts.    Follow up appointment in 2 week(s).    TAYLER Maravilla.

## 2023-06-20 ENCOUNTER — APPOINTMENT (OUTPATIENT)
Dept: VASCULAR LAB | Facility: MEDICAL CENTER | Age: 48
End: 2023-06-20
Attending: INTERNAL MEDICINE
Payer: COMMERCIAL

## 2023-07-20 ENCOUNTER — ANTICOAGULATION VISIT (OUTPATIENT)
Dept: VASCULAR LAB | Facility: MEDICAL CENTER | Age: 48
End: 2023-07-20
Attending: INTERNAL MEDICINE
Payer: COMMERCIAL

## 2023-07-20 VITALS — SYSTOLIC BLOOD PRESSURE: 145 MMHG | DIASTOLIC BLOOD PRESSURE: 71 MMHG

## 2023-07-20 DIAGNOSIS — D68.61 ANTIPHOSPHOLIPID ANTIBODY SYNDROME (HCC): ICD-10-CM

## 2023-07-20 DIAGNOSIS — I82.90 DEEP VEIN THROMBOSIS (DVT) OF NON-EXTREMITY VEIN, UNSPECIFIED CHRONICITY: ICD-10-CM

## 2023-07-20 DIAGNOSIS — G45.9 TRANSIENT ISCHEMIC ATTACK: ICD-10-CM

## 2023-07-20 DIAGNOSIS — I63.9 CEREBROVASCULAR ACCIDENT (CVA), UNSPECIFIED MECHANISM (HCC): ICD-10-CM

## 2023-07-20 LAB — INR PPP: 2.2 (ref 2–3.5)

## 2023-07-20 PROCEDURE — 99211 OFF/OP EST MAY X REQ PHY/QHP: CPT | Performed by: NURSE PRACTITIONER

## 2023-07-20 PROCEDURE — 85610 PROTHROMBIN TIME: CPT

## 2023-07-20 NOTE — PROGRESS NOTES
Anticoagulation Summary  As of 2023      INR goal:  2.0-3.0   TTR:  71.8 % (2.8 y)   INR used for dosin.20 (2023)   Warfarin maintenance plan:  5 mg (5 mg x 1) every day   Weekly warfarin total:  35 mg   Plan last modified:  César Peña, PharmD (2023)   Next INR check:  2023   Target end date:  Indefinite    Indications    Antiphospholipid antibody syndrome (HCC) [D68.61]  Transient ischemic attack [G45.9]                 Anticoagulation Episode Summary       INR check location:  Anticoagulation Clinic    Preferred lab:      Send INR reminders to:      Comments:            Anticoagulation Care Providers       Provider Role Specialty Phone number    Theron CRAVEN M.D. Referring Family Medicine [phone]    [facility]   [phone]                  Refer to Patient Findings for HPI:  Patient Findings       Negatives:  Signs/symptoms of thrombosis, Signs/symptoms of bleeding, Laboratory test error suspected, Change in health, Change in alcohol use, Change in activity, Upcoming invasive procedure, Emergency department visit, Upcoming dental procedure, Missed doses, Extra doses, Change in medications, Change in diet/appetite, Hospital admission, Bruising, Other complaints    Comments:  He missed his last appointment.            Vitals:    23 0730   BP: (!) 145/71       Verified current warfarin dosing schedule.    Medications reconciled   Pt is not on antiplatelet therapy      A/P   INR  -therapeutic. INR previously trending high but he is on a slightly reduced dose now. Will have him continue his current regimen.    Warfarin dosing recommendation: Continue taking 5 mg daily.    Pt educated to contact our clinic with any changes in medications or s/s of bleeding or thrombosis. Pt is aware to seek immediate medical attention for falls, head injury or deep cuts.    Follow up appointment in 4 week(s) per pt's request.    TAYLER Maravilla.

## 2023-08-22 ENCOUNTER — APPOINTMENT (OUTPATIENT)
Dept: VASCULAR LAB | Facility: MEDICAL CENTER | Age: 48
End: 2023-08-22
Attending: INTERNAL MEDICINE
Payer: COMMERCIAL

## 2023-08-29 ENCOUNTER — APPOINTMENT (OUTPATIENT)
Dept: VASCULAR LAB | Facility: MEDICAL CENTER | Age: 48
End: 2023-08-29
Attending: INTERNAL MEDICINE
Payer: COMMERCIAL

## 2023-09-05 ENCOUNTER — ANTICOAGULATION VISIT (OUTPATIENT)
Dept: VASCULAR LAB | Facility: MEDICAL CENTER | Age: 48
End: 2023-09-05
Attending: INTERNAL MEDICINE
Payer: COMMERCIAL

## 2023-09-05 DIAGNOSIS — G45.9 TRANSIENT ISCHEMIC ATTACK: ICD-10-CM

## 2023-09-05 DIAGNOSIS — D68.61 ANTIPHOSPHOLIPID ANTIBODY SYNDROME (HCC): ICD-10-CM

## 2023-09-05 LAB — INR PPP: 1.7 (ref 2–3.5)

## 2023-09-05 PROCEDURE — 99212 OFFICE O/P EST SF 10 MIN: CPT

## 2023-09-05 PROCEDURE — 85610 PROTHROMBIN TIME: CPT

## 2023-09-05 NOTE — PROGRESS NOTES
Anticoagulation Summary  As of 2023      INR goal:  2.0-3.0   TTR:  70.4 % (2.9 y)   INR used for dosin.70 (2023)   Warfarin maintenance plan:  5 mg (5 mg x 1) every day   Weekly warfarin total:  35 mg   Plan last modified:  César Peña, PharmD (2023)   Next INR check:  10/3/2023   Target end date:  Indefinite    Indications    Antiphospholipid antibody syndrome (HCC) [D68.61]  Transient ischemic attack [G45.9]                 Anticoagulation Episode Summary       INR check location:  Anticoagulation Clinic    Preferred lab:      Send INR reminders to:      Comments:            Anticoagulation Care Providers       Provider Role Specialty Phone number    Theron CRAVEN M.D. Referring Family Medicine [phone]    [facility]   [phone]             Refer to Patient Findings for HPI:  Patient Findings       Positives:  Missed doses (Missed Thu/Fri doses), Extra doses (Took 10 mg on Sat to make up for missed doses)    Negatives:  Signs/symptoms of thrombosis, Signs/symptoms of bleeding, Laboratory test error suspected, Change in health, Change in alcohol use, Change in activity, Upcoming invasive procedure, Emergency department visit, Upcoming dental procedure, Change in medications, Change in diet/appetite, Hospital admission, Bruising, Other complaints            There were no vitals filed for this visit.  Pt declined vitals    Verified current warfarin dosing schedule.    Medications reconciled: Yes  Pt is not on antiplatelet therapy      A/P   INR is subtherapeutic due to missed doses. Patient has taken bolus dose on Sat but will provide additional bolus dose today.    Warfarin dosing recommendation: Bolus with 7.5 mg today, then Continue regimen as listed above.    Pt educated to contact our clinic with any changes in medications or s/s of bleeding or thrombosis. Pt is aware to seek immediate medical attention for falls, head injury or deep cuts.    Requested pt to  return in 2 weeks, pt declines.  They will return in 4 weeks against my recommendation.    Jack Purvis, PharmD

## 2023-09-29 DIAGNOSIS — G45.9 TRANSIENT ISCHEMIC ATTACK: ICD-10-CM

## 2023-10-03 ENCOUNTER — ANTICOAGULATION VISIT (OUTPATIENT)
Dept: VASCULAR LAB | Facility: MEDICAL CENTER | Age: 48
End: 2023-10-03
Attending: INTERNAL MEDICINE
Payer: COMMERCIAL

## 2023-10-03 DIAGNOSIS — G45.9 TRANSIENT ISCHEMIC ATTACK: ICD-10-CM

## 2023-10-03 DIAGNOSIS — D68.61 ANTIPHOSPHOLIPID ANTIBODY SYNDROME (HCC): ICD-10-CM

## 2023-10-03 LAB — INR PPP: 1.2 (ref 2–3.5)

## 2023-10-03 PROCEDURE — 85610 PROTHROMBIN TIME: CPT

## 2023-10-03 PROCEDURE — 99212 OFFICE O/P EST SF 10 MIN: CPT

## 2023-10-03 NOTE — PROGRESS NOTES
Anticoagulation Summary  As of 10/3/2023      INR goal:  2.0-3.0   TTR:  68.6 % (3 y)   INR used for dosin.20 (10/3/2023)   Warfarin maintenance plan:  7.5 mg (5 mg x 1.5) every Tue, Fri; 5 mg (5 mg x 1) all other days   Weekly warfarin total:  40 mg   Plan last modified:  Maegan CorreaD (10/3/2023)   Next INR check:  10/10/2023   Target end date:  Indefinite    Indications    Antiphospholipid antibody syndrome (HCC) [D68.61]  Transient ischemic attack [G45.9]                 Anticoagulation Episode Summary       INR check location:  Anticoagulation Clinic    Preferred lab:      Send INR reminders to:      Comments:            Anticoagulation Care Providers       Provider Role Specialty Phone number    Theron CRAVEN M.D. Referring Family Medicine [phone]    [facility]   [phone]             Refer to Patient Findings for HPI:  Patient Findings       Positives:  Missed doses (Possibly missed some doses but unsure)    Negatives:  Signs/symptoms of thrombosis, Signs/symptoms of bleeding, Laboratory test error suspected, Change in health, Change in alcohol use, Change in activity, Upcoming invasive procedure, Emergency department visit, Upcoming dental procedure, Extra doses, Change in medications, Change in diet/appetite, Hospital admission, Bruising, Other complaints            There were no vitals filed for this visit.  Pt declined vitals    Verified current warfarin dosing schedule.    Medications reconciled: Yes  Pt is not on antiplatelet therapy      A/P   INR is subtherapeutic without obvious causes though unable to rule out missed doses. Will increase regimen this week and follow closely.    Warfarin dosing recommendation: Increase to 7.5 mg Tue/Fri, 5 mg all other days    Pt educated to contact our clinic with any changes in medications or s/s of bleeding or thrombosis. Pt is aware to seek immediate medical attention for falls, head injury or deep  cuts.    Follow up appointment in 1 week(s).    Jack Purvis, MaeganD

## 2023-10-10 ENCOUNTER — ANCILLARY PROCEDURE (OUTPATIENT)
Dept: VASCULAR LAB | Facility: MEDICAL CENTER | Age: 48
End: 2023-10-10
Attending: INTERNAL MEDICINE
Payer: COMMERCIAL

## 2023-10-10 DIAGNOSIS — G45.9 TRANSIENT ISCHEMIC ATTACK: ICD-10-CM

## 2023-10-10 DIAGNOSIS — D68.61 ANTIPHOSPHOLIPID ANTIBODY SYNDROME (HCC): ICD-10-CM

## 2023-10-10 LAB — INR PPP: 1.7 (ref 2–3.5)

## 2023-10-10 PROCEDURE — 99212 OFFICE O/P EST SF 10 MIN: CPT

## 2023-10-10 PROCEDURE — 85610 PROTHROMBIN TIME: CPT

## 2023-10-10 NOTE — PROGRESS NOTES
Anticoagulation Summary  As of 10/10/2023      INR goal:  2.0-3.0   TTR:  68.2 % (3 y)   INR used for dosin.70 (10/10/2023)   Warfarin maintenance plan:  7.5 mg (5 mg x 1.5) every Tue, Fri; 5 mg (5 mg x 1) all other days   Weekly warfarin total:  40 mg   Plan last modified:  Maeagn CorreaD (10/3/2023)   Next INR check:  10/17/2023   Target end date:  Indefinite    Indications    Antiphospholipid antibody syndrome (HCC) [D68.61]  Transient ischemic attack [G45.9]                 Anticoagulation Episode Summary       INR check location:  Anticoagulation Clinic    Preferred lab:      Send INR reminders to:      Comments:            Anticoagulation Care Providers       Provider Role Specialty Phone number    Theron CRAVEN M.D. Referring Family Medicine [phone]    [facility]   [phone]            Refer to Patient Findings for HPI:  Patient Findings       Positives:  Missed doses (ran out of warfarin and missed a dose on Saturday)    Negatives:  Signs/symptoms of thrombosis, Signs/symptoms of bleeding, Laboratory test error suspected, Change in health, Change in alcohol use, Change in activity, Upcoming invasive procedure, Emergency department visit, Upcoming dental procedure, Extra doses, Change in medications, Change in diet/appetite, Hospital admission, Bruising, Other complaints            There were no vitals filed for this visit.    Verified current warfarin dosing schedule.    Medications reconciled: Yes  Pt is not on antiplatelet therapy.      A/P   INR is subtherapeutic    Warfarin dosing recommendation: Continue regimen as listed above. I anticipate INR to continue to rise if pt continues w/ recently increased regimen.    Pt educated to contact our clinic with any changes in medications or s/s of bleeding or thrombosis. Pt is aware to seek immediate medical attention for falls, head injury or deep cuts.    Follow up appointment in 1 week(s).    Rita Ventura  PharmD

## 2023-10-17 ENCOUNTER — ANCILLARY PROCEDURE (OUTPATIENT)
Dept: VASCULAR LAB | Facility: MEDICAL CENTER | Age: 48
End: 2023-10-17
Attending: INTERNAL MEDICINE
Payer: COMMERCIAL

## 2023-10-17 DIAGNOSIS — D68.61 ANTIPHOSPHOLIPID ANTIBODY SYNDROME (HCC): ICD-10-CM

## 2023-10-17 DIAGNOSIS — G45.9 TRANSIENT ISCHEMIC ATTACK: ICD-10-CM

## 2023-10-17 LAB — INR PPP: 3.7 (ref 2–3.5)

## 2023-10-17 PROCEDURE — 99212 OFFICE O/P EST SF 10 MIN: CPT

## 2023-10-17 PROCEDURE — 85610 PROTHROMBIN TIME: CPT

## 2023-10-17 NOTE — PROGRESS NOTES
Anticoagulation Summary  As of 10/17/2023      INR goal:  2.0-3.0   TTR:  68.1 % (3 y)   INR used for dosing:  3.70 (10/17/2023)   Warfarin maintenance plan:  5 mg (5 mg x 1) every day   Weekly warfarin total:  35 mg   Plan last modified:  Latanya Carlisle (10/17/2023)   Next INR check:  10/30/2023   Target end date:  Indefinite    Indications    Antiphospholipid antibody syndrome (HCC) [D68.61]  Transient ischemic attack [G45.9]                 Anticoagulation Episode Summary       INR check location:  Anticoagulation Clinic    Preferred lab:      Send INR reminders to:      Comments:            Anticoagulation Care Providers       Provider Role Specialty Phone number    Theron CRAVEN M.D. Referring Family Medicine [phone]    [facility]   [phone]          Refer to Patient Findings for HPI:  Patient Findings       Negatives:  Signs/symptoms of thrombosis, Signs/symptoms of bleeding, Laboratory test error suspected, Change in health, Change in alcohol use, Change in activity, Upcoming invasive procedure, Emergency department visit, Upcoming dental procedure, Missed doses, Extra doses, Change in medications, Change in diet/appetite, Hospital admission, Bruising, Other complaints          There were no vitals filed for this visit.  The pt declined vitals today     Patient seen in clinic today for follow up on anticoagulation therapy with warfarin (a high risk medication) for hx of APLS and TIA  Verified current warfarin dosing schedule.  Patient denies any missed doses of warfarin.      Medications reconciled   Pt is not on antiplatelet therapy      A/P   INR is SUPRA-therapeutic today at 3.7.     Warfarin dosing recommendation: Patient recently started the increased regimen and already took his dose today. Will have him HOLD warfarin tomorrow, then begin previous regimen of 5mg QD thereafter.   Patient to follow up again in 2 weeks.     Pt educated to contact our clinic with  any changes in medications or s/s of bleeding or thrombosis. Pt is aware to seek immediate medical attention for falls, head injury or deep cuts.    Follow up appointment in 2 week(s).    Next appt: Mon, Oct 30 @ 7:30am     Vasyl Carlisle PharmD

## 2023-11-01 ENCOUNTER — TELEPHONE (OUTPATIENT)
Dept: VASCULAR LAB | Facility: MEDICAL CENTER | Age: 48
End: 2023-11-01
Payer: COMMERCIAL

## 2023-11-29 ENCOUNTER — ANTICOAGULATION VISIT (OUTPATIENT)
Dept: VASCULAR LAB | Facility: MEDICAL CENTER | Age: 48
End: 2023-11-29
Attending: INTERNAL MEDICINE
Payer: COMMERCIAL

## 2023-11-29 DIAGNOSIS — G45.9 TRANSIENT ISCHEMIC ATTACK: ICD-10-CM

## 2023-11-29 DIAGNOSIS — D68.61 ANTIPHOSPHOLIPID ANTIBODY SYNDROME (HCC): ICD-10-CM

## 2023-11-29 LAB — INR PPP: 2.7 (ref 2–3.5)

## 2023-11-29 PROCEDURE — 99211 OFF/OP EST MAY X REQ PHY/QHP: CPT

## 2023-11-29 PROCEDURE — 85610 PROTHROMBIN TIME: CPT

## 2023-11-29 NOTE — PROGRESS NOTES
Anticoagulation Summary  As of 2023      INR goal:  2.0-3.0   TTR:  66.7 % (3.2 y)   INR used for dosin.70 (2023)   Warfarin maintenance plan:  5 mg (5 mg x 1) every day   Weekly warfarin total:  35 mg   Plan last modified:  Latanya Carlisle (10/17/2023)   Next INR check:  2023   Target end date:  Indefinite    Indications    Antiphospholipid antibody syndrome (HCC) [D68.61]  Transient ischemic attack [G45.9]                 Anticoagulation Episode Summary       INR check location:  Anticoagulation Clinic    Preferred lab:      Send INR reminders to:      Comments:            Anticoagulation Care Providers       Provider Role Specialty Phone number    Theron CRAVEN M.D. Referring Family Medicine [phone]    [facility]   [phone]                  Refer to Patient Findings for HPI:  Patient Findings       Positives:  Change in alcohol use (Slightly more EtOH this week)    Negatives:  Signs/symptoms of thrombosis, Signs/symptoms of bleeding, Laboratory test error suspected, Change in health, Change in activity, Upcoming invasive procedure, Emergency department visit, Upcoming dental procedure, Missed doses, Extra doses, Change in medications, Change in diet/appetite, Hospital admission, Bruising, Other complaints            There were no vitals filed for this visit.    Verified current warfarin dosing schedule.    Medications reconciled: Yes  Pt is not on antiplatelet therapy      A/P   INR is therapeutic    Warfarin dosing recommendation: Instructed pt to continue on with current regimen.    Pt educated to contact our clinic with any changes in medications or s/s of bleeding or thrombosis. Pt is aware to seek immediate medical attention for falls, head injury or deep cuts.    Follow up appointment in 2 week(s).    Edward Donnelly, PharmD, BCACP

## 2023-12-12 ENCOUNTER — ANTICOAGULATION VISIT (OUTPATIENT)
Dept: VASCULAR LAB | Facility: MEDICAL CENTER | Age: 48
End: 2023-12-12
Attending: INTERNAL MEDICINE
Payer: COMMERCIAL

## 2023-12-12 DIAGNOSIS — D68.61 ANTIPHOSPHOLIPID ANTIBODY SYNDROME (HCC): ICD-10-CM

## 2023-12-12 DIAGNOSIS — G45.9 TRANSIENT ISCHEMIC ATTACK: ICD-10-CM

## 2023-12-12 LAB — INR PPP: 3.3 (ref 2–3.5)

## 2023-12-12 PROCEDURE — 99212 OFFICE O/P EST SF 10 MIN: CPT

## 2023-12-12 PROCEDURE — 85610 PROTHROMBIN TIME: CPT

## 2023-12-12 NOTE — PROGRESS NOTES
Anticoagulation Summary  As of 12/12/2023      INR goal:  2.0-3.0   TTR:  66.5 % (3.2 y)   INR used for dosing:  3.30 (12/12/2023)   Warfarin maintenance plan:  5 mg (5 mg x 1) every day   Weekly warfarin total:  35 mg   Plan last modified:  Laatnya Carlisle (10/17/2023)   Next INR check:  1/2/2024   Target end date:  Indefinite    Indications    Antiphospholipid antibody syndrome (HCC) [D68.61]  Transient ischemic attack [G45.9]                 Anticoagulation Episode Summary       INR check location:  Anticoagulation Clinic    Preferred lab:      Send INR reminders to:      Comments:            Anticoagulation Care Providers       Provider Role Specialty Phone number    Theron CRAVEN M.D. Referring Family Medicine [phone]    [facility]   [phone]          Refer to Patient Findings for HPI:  Patient Findings       Negatives:  Signs/symptoms of thrombosis, Signs/symptoms of bleeding, Laboratory test error suspected, Change in health, Change in alcohol use, Change in activity, Upcoming invasive procedure, Emergency department visit, Upcoming dental procedure, Missed doses, Extra doses, Change in medications, Change in diet/appetite, Hospital admission, Bruising, Other complaints          There were no vitals filed for this visit.  The pt declined vitals today     Patient seen in clinic today for follow up on anticoagulation therapy with warfarin (a high risk medication) for hx of APLS and TIA  Verified current warfarin dosing schedule.  Patient denies any missed doses of warfarin.    Medications reconciled   Pt is not on antiplatelet therapy      A/P   INR is SUPRA-therapeutic today at 3.3.     Warfarin dosing recommendation: Patient already took his dose today so will reduce dose tomorrow to 2.5mg and then resume his current dosing regimen.   Patient will follow up again in 3 weeks.     Pt educated to contact our clinic with any changes in medications or s/s of bleeding or  thrombosis. Pt is aware to seek immediate medical attention for falls, head injury or deep cuts.    Follow up appointment in 3 week(s).    Next appt: Tues, Jan 2 @ 7:30am     Vasyl Carlisle PharmD

## 2024-01-02 ENCOUNTER — ANTICOAGULATION VISIT (OUTPATIENT)
Dept: VASCULAR LAB | Facility: MEDICAL CENTER | Age: 49
End: 2024-01-02
Attending: INTERNAL MEDICINE
Payer: COMMERCIAL

## 2024-01-02 DIAGNOSIS — D68.61 ANTIPHOSPHOLIPID ANTIBODY SYNDROME (HCC): ICD-10-CM

## 2024-01-02 DIAGNOSIS — G45.9 TRANSIENT ISCHEMIC ATTACK: ICD-10-CM

## 2024-01-02 LAB — INR PPP: 3.1 (ref 2–3.5)

## 2024-01-02 PROCEDURE — 85610 PROTHROMBIN TIME: CPT

## 2024-01-02 PROCEDURE — 99212 OFFICE O/P EST SF 10 MIN: CPT

## 2024-01-02 NOTE — PROGRESS NOTES
Anticoagulation Summary  As of 1/2/2024      INR goal:  2.0-3.0   TTR:  65.3 % (3.3 y)   INR used for dosing:  3.10 (1/2/2024)   Warfarin maintenance plan:  5 mg (5 mg x 1) every day   Weekly warfarin total:  35 mg   Plan last modified:  Latanya Carlisle (10/17/2023)   Next INR check:  1/23/2024   Target end date:  Indefinite    Indications    Antiphospholipid antibody syndrome (HCC) [D68.61]  Transient ischemic attack [G45.9]                 Anticoagulation Episode Summary       INR check location:  Anticoagulation Clinic    Preferred lab:      Send INR reminders to:      Comments:            Anticoagulation Care Providers       Provider Role Specialty Phone number    Theron CRAVEN M.D. Referring Family Medicine [phone]    [facility]   [phone]                  Refer to Patient Findings for HPI:  Patient Findings       Positives:  Change in diet/appetite (decreased consumption of vit K)    Negatives:  Signs/symptoms of thrombosis, Signs/symptoms of bleeding, Laboratory test error suspected, Change in health, Change in alcohol use, Change in activity, Upcoming invasive procedure, Emergency department visit, Upcoming dental procedure, Missed doses, Extra doses, Change in medications, Hospital admission, Bruising, Other complaints            There were no vitals filed for this visit.  Pt declined vitals    Verified current warfarin dosing schedule.    Medications reconciled: No  Pt is not on antiplatelet therapy.      A/P   INR is slightly supratherapeutic    Warfarin dosing recommendation: Continue regimen as listed above. Pt to increase vit K intake    Pt educated to contact our clinic with any changes in medications or s/s of bleeding or thrombosis. Pt is aware to seek immediate medical attention for falls, head injury or deep cuts.    Request pt to return in 3 week(s). Pt agrees.    Rita Ventura, PharmD

## 2024-01-24 ENCOUNTER — TELEPHONE (OUTPATIENT)
Dept: VASCULAR LAB | Facility: MEDICAL CENTER | Age: 49
End: 2024-01-24
Payer: COMMERCIAL

## 2024-02-05 ENCOUNTER — ANTICOAGULATION VISIT (OUTPATIENT)
Dept: VASCULAR LAB | Facility: MEDICAL CENTER | Age: 49
End: 2024-02-05
Attending: INTERNAL MEDICINE
Payer: COMMERCIAL

## 2024-02-05 DIAGNOSIS — D68.61 ANTIPHOSPHOLIPID ANTIBODY SYNDROME (HCC): ICD-10-CM

## 2024-02-05 DIAGNOSIS — G45.9 TRANSIENT ISCHEMIC ATTACK: ICD-10-CM

## 2024-02-05 LAB — INR PPP: 2.5 (ref 2–3.5)

## 2024-02-05 PROCEDURE — 85610 PROTHROMBIN TIME: CPT

## 2024-02-05 PROCEDURE — 99211 OFF/OP EST MAY X REQ PHY/QHP: CPT

## 2024-02-05 NOTE — PROGRESS NOTES
Anticoagulation Summary  As of 2024      INR goal:  2.0-3.0   TTR:  65.8 % (3.3 y)   INR used for dosin.50 (2024)   Warfarin maintenance plan:  5 mg (5 mg x 1) every day   Weekly warfarin total:  35 mg   Plan last modified:  Latanya Carlisle (10/17/2023)   Next INR check:  3/4/2024   Target end date:  Indefinite    Indications    Antiphospholipid antibody syndrome (HCC) [D68.61]  Transient ischemic attack [G45.9]                 Anticoagulation Episode Summary       INR check location:  Anticoagulation Clinic    Preferred lab:      Send INR reminders to:      Comments:            Anticoagulation Care Providers       Provider Role Specialty Phone number    Theron CRAVEN M.D. Referring Family Medicine [phone]    [facility]   [phone]          Refer to Patient Findings for HPI:  Patient Findings       Negatives:  Signs/symptoms of thrombosis, Signs/symptoms of bleeding, Laboratory test error suspected, Change in health, Change in alcohol use, Change in activity, Upcoming invasive procedure, Emergency department visit, Upcoming dental procedure, Missed doses, Extra doses, Change in medications, Change in diet/appetite, Hospital admission, Bruising, Other complaints          There were no vitals filed for this visit.  The pt declined vitals today     Patient seen in clinic today for follow up on anticoagulation therapy with warfarin (a high risk medication) for hx of APLS  Verified current warfarin dosing schedule.  Patient denies any missed doses of warfarin.    Medications reconciled   Pt is not on antiplatelet therapy      A/P   INR is therapeutic today at 2.5.     Warfarin dosing recommendation: Continue with the current dosing regimen.   Patient will follow up again in 4 weeks     Pt educated to contact our clinic with any changes in medications or s/s of bleeding or thrombosis. Pt is aware to seek immediate medical attention for falls, head injury or deep  cuts.    Follow up appointment in 4 week(s).    Next appt: Mon, March 4 @ 8am     Vasyl Carlisle PharmD

## 2024-02-06 ENCOUNTER — APPOINTMENT (OUTPATIENT)
Dept: RADIOLOGY | Facility: MEDICAL CENTER | Age: 49
End: 2024-02-06
Payer: COMMERCIAL

## 2024-02-06 DIAGNOSIS — Z79.01 LONG TERM (CURRENT) USE OF ANTICOAGULANTS: ICD-10-CM

## 2024-02-06 DIAGNOSIS — M79.662 BILATERAL CALF PAIN: ICD-10-CM

## 2024-02-06 DIAGNOSIS — Z86.73 PERSONAL HISTORY OF TRANSIENT CEREBRAL ISCHEMIA: ICD-10-CM

## 2024-02-06 DIAGNOSIS — M79.661 BILATERAL CALF PAIN: ICD-10-CM

## 2024-02-06 PROCEDURE — 93971 EXTREMITY STUDY: CPT | Mod: LT

## 2024-03-04 ENCOUNTER — ANTICOAGULATION VISIT (OUTPATIENT)
Dept: VASCULAR LAB | Facility: MEDICAL CENTER | Age: 49
End: 2024-03-04
Attending: INTERNAL MEDICINE
Payer: COMMERCIAL

## 2024-03-04 DIAGNOSIS — G45.9 TRANSIENT ISCHEMIC ATTACK: ICD-10-CM

## 2024-03-04 DIAGNOSIS — D68.61 ANTIPHOSPHOLIPID ANTIBODY SYNDROME (HCC): ICD-10-CM

## 2024-03-04 LAB — INR PPP: 1.9 (ref 2–3.5)

## 2024-03-04 PROCEDURE — 99212 OFFICE O/P EST SF 10 MIN: CPT

## 2024-03-04 PROCEDURE — 85610 PROTHROMBIN TIME: CPT

## 2024-03-04 NOTE — PROGRESS NOTES
Anticoagulation Summary  As of 3/4/2024      INR goal:  2.0-3.0   TTR:  66.2% (3.4 y)   INR used for dosin.90 (3/4/2024)   Warfarin maintenance plan:  5 mg (5 mg x 1) every day   Weekly warfarin total:  35 mg   Plan last modified:  Latanya Carlisle (10/17/2023)   Next INR check:  3/18/2024   Target end date:  Indefinite    Indications    Antiphospholipid antibody syndrome (HCC) [D68.61]  Transient ischemic attack [G45.9]                 Anticoagulation Episode Summary       INR check location:  Anticoagulation Clinic    Preferred lab:      Send INR reminders to:      Comments:            Anticoagulation Care Providers       Provider Role Specialty Phone number    Theron CRAVEN M.D. Referring Family Medicine [phone]    [facility]   [phone]                  Refer to Patient Findings for HPI:  Patient Findings       Positives:  Missed doses (Patient ran out of medication and pharmacy was late filling it.)    Negatives:  Signs/symptoms of thrombosis, Signs/symptoms of bleeding, Laboratory test error suspected, Change in health, Change in alcohol use, Change in activity, Upcoming invasive procedure, Emergency department visit, Upcoming dental procedure, Extra doses, Change in medications, Change in diet/appetite, Hospital admission, Bruising, Other complaints            There were no vitals filed for this visit.  Pt declined vitals    Verified current warfarin dosing schedule.    Medications reconciled: Yes  Pt is not on antiplatelet therapy.      A/P   INR is slightly subtherapeutic  likely due to missed doses.    Warfarin dosing recommendation: Continue regimen as listed above.    Pt educated to contact our clinic with any changes in medications or s/s of bleeding or thrombosis. Pt is aware to seek immediate medical attention for falls, head injury or deep cuts.    Request pt to return in 2 week(s). Pt agrees.    Gigi Brink, MaeganD

## 2024-03-18 ENCOUNTER — APPOINTMENT (OUTPATIENT)
Dept: VASCULAR LAB | Facility: MEDICAL CENTER | Age: 49
End: 2024-03-18
Attending: INTERNAL MEDICINE
Payer: COMMERCIAL

## 2024-05-22 ENCOUNTER — ANTICOAGULATION VISIT (OUTPATIENT)
Dept: VASCULAR LAB | Facility: MEDICAL CENTER | Age: 49
End: 2024-05-22
Attending: INTERNAL MEDICINE
Payer: COMMERCIAL

## 2024-05-22 DIAGNOSIS — G45.9 TRANSIENT ISCHEMIC ATTACK: ICD-10-CM

## 2024-05-22 DIAGNOSIS — D68.61 ANTIPHOSPHOLIPID ANTIBODY SYNDROME (HCC): ICD-10-CM

## 2024-05-22 LAB — INR PPP: 2.4 (ref 2–3.5)

## 2024-05-22 RX ORDER — WARFARIN SODIUM 10 MG/1
5 TABLET ORAL DAILY
COMMUNITY

## 2024-05-22 NOTE — PROGRESS NOTES
Anticoagulation Summary  As of 2024      INR goal:  2.0-3.0   TTR:  67.0% (3.6 y)   INR used for dosin.40 (2024)   Warfarin maintenance plan:  5 mg (10 mg x 0.5) every day   Weekly warfarin total:  35 mg   Plan last modified:  VIRGINIA Maravilla (2024)   Next INR check:  2024   Target end date:  Indefinite    Indications    Antiphospholipid antibody syndrome (HCC) [D68.61]  Transient ischemic attack [G45.9]                 Anticoagulation Episode Summary       INR check location:  Anticoagulation Clinic    Preferred lab:      Send INR reminders to:      Comments:            Anticoagulation Care Providers       Provider Role Specialty Phone number    Theron CRAVEN M.D. Referring Family Medicine [phone]    [facility]   [phone]                  Refer to Patient Findings for HPI:  Patient Findings       Negatives:  Signs/symptoms of thrombosis, Signs/symptoms of bleeding, Laboratory test error suspected, Change in health, Change in alcohol use, Change in activity, Upcoming invasive procedure, Emergency department visit, Upcoming dental procedure, Missed doses, Extra doses, Change in medications, Change in diet/appetite, Hospital admission, Bruising, Other complaints    Comments:  Pt states the last time he went to  his warfarin at the pharmacy they were out of 5 mg tablets and dispensed 10 mg tablets instead. He is breaking them in half.            Verified current warfarin dosing schedule.    Medications reconciled: Yes  Pt is not on antiplatelet therapy.      A/P   INR is therapeutic. He still has plenty of 10 mg tablets and doesn't mind cutting them in half.     Warfarin dosing recommendation: Continue regimen as listed above.    Pt educated to contact our clinic with any changes in medications or s/s of bleeding or thrombosis. Pt is aware to seek immediate medical attention for falls, head injury or deep cuts.    Request pt to return in 4  week(s). Pt agrees.    VIRGINIA Maravilla

## 2024-06-18 ENCOUNTER — ANTICOAGULATION VISIT (OUTPATIENT)
Dept: VASCULAR LAB | Facility: MEDICAL CENTER | Age: 49
End: 2024-06-18
Attending: INTERNAL MEDICINE
Payer: COMMERCIAL

## 2024-06-18 DIAGNOSIS — D68.61 ANTIPHOSPHOLIPID ANTIBODY SYNDROME (HCC): ICD-10-CM

## 2024-06-18 DIAGNOSIS — G45.9 TRANSIENT ISCHEMIC ATTACK: ICD-10-CM

## 2024-06-18 LAB — INR PPP: 2.6 (ref 2–3.5)

## 2024-06-18 PROCEDURE — 99211 OFF/OP EST MAY X REQ PHY/QHP: CPT

## 2024-06-18 PROCEDURE — 85610 PROTHROMBIN TIME: CPT

## 2024-06-18 NOTE — PROGRESS NOTES
Anticoagulation Summary  As of 2024      INR goal:  2.0-3.0   TTR:  67.7% (3.7 y)   INR used for dosin.60 (2024)   Warfarin maintenance plan:  5 mg (10 mg x 0.5) every day   Weekly warfarin total:  35 mg   Plan last modified:  VIRGINIA Maravilla (2024)   Next INR check:  2024   Target end date:  Indefinite    Indications    Antiphospholipid antibody syndrome (HCC) [D68.61]  Transient ischemic attack [G45.9]                 Anticoagulation Episode Summary       INR check location:  Anticoagulation Clinic    Preferred lab:      Send INR reminders to:      Comments:            Anticoagulation Care Providers       Provider Role Specialty Phone number    Theron CRAVEN M.D. Referring Family Medicine [phone]    [facility]   [phone]             Refer to Patient Findings for HPI:  Patient Findings       Negatives:  Signs/symptoms of thrombosis, Signs/symptoms of bleeding, Laboratory test error suspected, Change in health, Change in alcohol use, Change in activity, Upcoming invasive procedure, Emergency department visit, Upcoming dental procedure, Missed doses, Extra doses, Change in medications, Change in diet/appetite, Hospital admission, Bruising, Other complaints            There were no vitals filed for this visit.  Pt declined vitals    Verified current warfarin dosing schedule.    Medications reconciled: Yes  Pt is not on antiplatelet therapy      A/P   INR is therapeutic    Warfarin dosing recommendation: Continue regimen as listed above.    Pt educated to contact our clinic with any changes in medications or s/s of bleeding or thrombosis. Pt is aware to seek immediate medical attention for falls, head injury or deep cuts.    Request pt to return in 4 week(s). Pt agrees.    Jack Purvis, MaeganD

## 2024-07-16 ENCOUNTER — ANTICOAGULATION VISIT (OUTPATIENT)
Dept: VASCULAR LAB | Facility: MEDICAL CENTER | Age: 49
End: 2024-07-16
Attending: INTERNAL MEDICINE
Payer: COMMERCIAL

## 2024-07-16 DIAGNOSIS — G45.9 TRANSIENT ISCHEMIC ATTACK: ICD-10-CM

## 2024-07-16 DIAGNOSIS — D68.61 ANTIPHOSPHOLIPID ANTIBODY SYNDROME (HCC): ICD-10-CM

## 2024-07-16 LAB — INR PPP: 3.6 (ref 2–3.5)

## 2024-07-16 PROCEDURE — 99212 OFFICE O/P EST SF 10 MIN: CPT

## 2024-07-16 PROCEDURE — 85610 PROTHROMBIN TIME: CPT

## 2024-08-06 ENCOUNTER — ANTICOAGULATION VISIT (OUTPATIENT)
Dept: VASCULAR LAB | Facility: MEDICAL CENTER | Age: 49
End: 2024-08-06
Attending: INTERNAL MEDICINE
Payer: COMMERCIAL

## 2024-08-06 VITALS — DIASTOLIC BLOOD PRESSURE: 89 MMHG | SYSTOLIC BLOOD PRESSURE: 139 MMHG | HEART RATE: 53 BPM

## 2024-08-06 DIAGNOSIS — G45.9 TRANSIENT ISCHEMIC ATTACK: ICD-10-CM

## 2024-08-06 DIAGNOSIS — D68.61 ANTIPHOSPHOLIPID ANTIBODY SYNDROME (HCC): ICD-10-CM

## 2024-08-06 LAB — INR PPP: 2.1 (ref 2–3.5)

## 2024-08-06 PROCEDURE — 3079F DIAST BP 80-89 MM HG: CPT | Performed by: NURSE PRACTITIONER

## 2024-08-06 PROCEDURE — 99211 OFF/OP EST MAY X REQ PHY/QHP: CPT

## 2024-08-06 PROCEDURE — 85610 PROTHROMBIN TIME: CPT

## 2024-08-06 PROCEDURE — 93793 ANTICOAG MGMT PT WARFARIN: CPT | Performed by: NURSE PRACTITIONER

## 2024-08-06 PROCEDURE — 3075F SYST BP GE 130 - 139MM HG: CPT | Performed by: NURSE PRACTITIONER

## 2024-08-06 NOTE — PROGRESS NOTES
Anticoagulation Summary  As of 2024      INR goal:  2.0-3.0   TTR:  67.0% (3.8 y)   INR used for dosin.10 (2024)   Warfarin maintenance plan:  5 mg (10 mg x 0.5) every day   Weekly warfarin total:  35 mg   Plan last modified:  VIRGINIA Maravilla (2024)   Next INR check:  9/3/2024   Target end date:  Indefinite    Indications    Antiphospholipid antibody syndrome (HCC) [D68.61]  Transient ischemic attack [G45.9]                 Anticoagulation Episode Summary       INR check location:  Anticoagulation Clinic    Preferred lab:      Send INR reminders to:      Comments:            Anticoagulation Care Providers       Provider Role Specialty Phone number    Theron CRAVEN M.D. Referring Family Medicine [phone]    [facility]   [phone]                  Refer to Patient Findings for HPI:  Patient Findings       Negatives:  Signs/symptoms of thrombosis, Signs/symptoms of bleeding, Laboratory test error suspected, Change in health, Change in alcohol use, Change in activity, Upcoming invasive procedure, Emergency department visit, Upcoming dental procedure, Missed doses, Extra doses, Change in medications, Change in diet/appetite, Hospital admission, Bruising, Other complaints            Vitals:    24 0742   BP: 139/89   Pulse: (!) 53       Verified current warfarin dosing schedule.    Medications reconciled: Yes  Pt is not on antiplatelet therapy      A/P   INR is therapeutic    Warfarin dosing recommendation: Continue regimen as listed above.    Pt educated to contact our clinic with any changes in medications or s/s of bleeding or thrombosis. Pt is aware to seek immediate medical attention for falls, head injury or deep cuts.    Request pt to return in 4 week(s). Pt agrees.    Jack Purvis, MaeganD

## 2024-08-13 ENCOUNTER — PATIENT MESSAGE (OUTPATIENT)
Dept: HEALTH INFORMATION MANAGEMENT | Facility: OTHER | Age: 49
End: 2024-08-13

## 2024-09-03 ENCOUNTER — ANTICOAGULATION VISIT (OUTPATIENT)
Dept: VASCULAR LAB | Facility: MEDICAL CENTER | Age: 49
End: 2024-09-03
Attending: INTERNAL MEDICINE
Payer: COMMERCIAL

## 2024-09-03 DIAGNOSIS — G45.9 TRANSIENT ISCHEMIC ATTACK: ICD-10-CM

## 2024-09-03 DIAGNOSIS — D68.61 ANTIPHOSPHOLIPID ANTIBODY SYNDROME (HCC): ICD-10-CM

## 2024-09-03 LAB — INR PPP: 1.3 (ref 2–3.5)

## 2024-09-03 PROCEDURE — 99212 OFFICE O/P EST SF 10 MIN: CPT

## 2024-09-03 PROCEDURE — 85610 PROTHROMBIN TIME: CPT

## 2024-09-03 NOTE — PROGRESS NOTES
Anticoagulation Summary  As of 9/3/2024      INR goal:  2.0-3.0   TTR:  65.9% (3.9 y)   INR used for dosin.30 (9/3/2024)   Warfarin maintenance plan:  5 mg (10 mg x 0.5) every day   Weekly warfarin total:  35 mg   Plan last modified:  VIRGINIA Maravilla (2024)   Next INR check:  2024   Target end date:  Indefinite    Indications    Antiphospholipid antibody syndrome (HCC) [D68.61]  Transient ischemic attack [G45.9]                 Anticoagulation Episode Summary       INR check location:  Anticoagulation Clinic    Preferred lab:  --    Send INR reminders to:  --    Comments:  --          Anticoagulation Care Providers       Provider Role Specialty Phone number    Theron CRAVEN M.D. Referring Family Medicine [phone]    [facility]   [phone]                  Refer to Patient Findings for HPI:  Patient Findings       Positives:  Missed doses    Negatives:  Signs/symptoms of thrombosis, Signs/symptoms of bleeding, Laboratory test error suspected, Change in health, Change in alcohol use, Change in activity, Upcoming invasive procedure, Emergency department visit, Upcoming dental procedure, Extra doses, Change in medications, Change in diet/appetite, Hospital admission, Bruising, Other complaints            There were no vitals filed for this visit.  Verified current warfarin dosing schedule.    Medications reconciled.  Pt is not on antiplatelet therapy.      A/P   INR is subtherapeutic  Reason(s) for out of range INR today: Missed dose(s)      Warfarin dosing recommendation: Bolus with 10mg x 1 day, then Continue regimen as listed above.    Pt educated to contact our clinic with any changes in medications or s/s of bleeding or thrombosis. Pt is aware to seek immediate medical attention for falls, head injury or deep cuts.    Request pt to return in 2 week(s). Pt agrees.    Rita Ventura, MaeganD

## 2024-09-18 ENCOUNTER — TELEPHONE (OUTPATIENT)
Dept: VASCULAR LAB | Facility: MEDICAL CENTER | Age: 49
End: 2024-09-18
Payer: COMMERCIAL

## 2024-09-30 DIAGNOSIS — G45.9 TRANSIENT ISCHEMIC ATTACK: ICD-10-CM

## 2024-10-08 ENCOUNTER — TELEPHONE (OUTPATIENT)
Dept: HEALTH INFORMATION MANAGEMENT | Facility: OTHER | Age: 49
End: 2024-10-08

## 2024-10-14 ENCOUNTER — ANTICOAGULATION VISIT (OUTPATIENT)
Dept: VASCULAR LAB | Facility: MEDICAL CENTER | Age: 49
End: 2024-10-14
Attending: INTERNAL MEDICINE
Payer: COMMERCIAL

## 2024-10-14 DIAGNOSIS — D68.61 ANTIPHOSPHOLIPID ANTIBODY SYNDROME (HCC): ICD-10-CM

## 2024-10-14 DIAGNOSIS — G45.9 TRANSIENT ISCHEMIC ATTACK: ICD-10-CM

## 2024-10-14 LAB — INR PPP: 3.5 (ref 2–3.5)

## 2024-10-14 PROCEDURE — 85610 PROTHROMBIN TIME: CPT

## 2024-10-14 PROCEDURE — 99212 OFFICE O/P EST SF 10 MIN: CPT

## 2024-10-29 ENCOUNTER — ANTICOAGULATION VISIT (OUTPATIENT)
Dept: VASCULAR LAB | Facility: MEDICAL CENTER | Age: 49
End: 2024-10-29
Attending: INTERNAL MEDICINE
Payer: COMMERCIAL

## 2024-10-29 DIAGNOSIS — D68.61 ANTIPHOSPHOLIPID ANTIBODY SYNDROME (HCC): ICD-10-CM

## 2024-10-29 DIAGNOSIS — G45.9 TRANSIENT ISCHEMIC ATTACK: ICD-10-CM

## 2024-10-29 LAB — INR PPP: 2 (ref 2–3.5)

## 2024-10-29 PROCEDURE — 85610 PROTHROMBIN TIME: CPT

## 2024-10-29 PROCEDURE — 99211 OFF/OP EST MAY X REQ PHY/QHP: CPT

## 2024-11-12 NOTE — PROGRESS NOTES
"Anticoagulation Summary  As of 2020    INR goal:  2.0-3.0   TTR:  --   INR used for dosin.40 (2020)   Warfarin maintenance plan:  7.5 mg (5 mg x 1.5) every Mon; 5 mg (5 mg x 1) all other days   Weekly warfarin total:  37.5 mg   Plan last modified:  Lorena Whiting, PharmD (2020)   Next INR check:  2020   Target end date:  Indefinite         Anticoagulation Episode Summary     INR check location:      Preferred lab:      Send INR reminders to:      Comments:        Anticoagulation Care Providers     Provider Role Specialty Phone number    Theron CRAVEN M.D. Referring Family Medicine [phone]        Anticoagulation Patient Findings      HPI:  Mahesh De Paz seen in clinic today, on anticoagulation therapy with warfarin for antiphospholipid syndrome.   Changes to current medical/health status since last appt: none  Denies signs/symptoms of bleeding and/or thrombosis since the last appt.    Denies any interval changes to diet  Denies any interval changes to medications since last appt.   Denies any complications or cost restrictions with current therapy.   BP recorded in vitals Pt declined  Pt been taking 20 mg of xarelto. Will have pt stop xarelto today.  Pt received flu shot in clinic today.    A/P   INR  subtherapeutic.   Will have pt take 7.5 mg of warfarin today and tomorrow then continue with current regimen. Pt educated to be mindful of any signs of bleeding or clotting over the weekend.    Discussed continuing with \"bridging\". Given pt's TIA/stroke was >15 years ago, pt has been on dual anticoagulation for 3 days, likely risk of continuing dual anticoagulation outweighs benefit.  Pt is in agreement and we used joint decision making to stop Xarelto today and increase warfarin regimen.     Follow up appointment in 4 days  [facility] Intern  César Peña, PharmD    " 12-Nov-2024 04:15

## 2024-11-19 ENCOUNTER — APPOINTMENT (OUTPATIENT)
Dept: VASCULAR LAB | Facility: MEDICAL CENTER | Age: 49
End: 2024-11-19
Payer: COMMERCIAL

## 2025-01-29 ENCOUNTER — ANTICOAGULATION VISIT (OUTPATIENT)
Dept: VASCULAR LAB | Facility: MEDICAL CENTER | Age: 50
End: 2025-01-29
Attending: INTERNAL MEDICINE
Payer: COMMERCIAL

## 2025-01-29 DIAGNOSIS — G45.9 TRANSIENT ISCHEMIC ATTACK: ICD-10-CM

## 2025-01-29 DIAGNOSIS — D68.61 ANTIPHOSPHOLIPID ANTIBODY SYNDROME (HCC): ICD-10-CM

## 2025-01-29 LAB — INR PPP: 3 (ref 2–3.5)

## 2025-01-29 PROCEDURE — 99211 OFF/OP EST MAY X REQ PHY/QHP: CPT

## 2025-01-29 PROCEDURE — 85610 PROTHROMBIN TIME: CPT

## 2025-01-29 NOTE — PROGRESS NOTES
Anticoagulation Summary  As of 1/29/2025      INR goal:  2.0-3.0   TTR:  67.4% (4.3 y)   INR used for dosing:  3.00 (1/29/2025)   Warfarin maintenance plan:  5 mg (10 mg x 0.5) every day   Weekly warfarin total:  35 mg   Plan last modified:  VIRGINIA Maravilla (5/22/2024)   Next INR check:  3/12/2025   Target end date:  Indefinite    Indications    Antiphospholipid antibody syndrome (HCC) [D68.61]  Transient ischemic attack [G45.9]                 Anticoagulation Episode Summary       INR check location:  Anticoagulation Clinic    Preferred lab:  --    Send INR reminders to:  --    Comments:  --          Anticoagulation Care Providers       Provider Role Specialty Phone number    Theron CRAVEN M.D. Referring Family Medicine [phone]    [facility]   [phone]                  Refer to Patient Findings for HPI:  Patient Findings       Negatives:  Signs/symptoms of thrombosis, Signs/symptoms of bleeding, Laboratory test error suspected, Change in health, Change in alcohol use, Change in activity, Upcoming invasive procedure, Emergency department visit, Upcoming dental procedure, Missed doses, Extra doses, Change in medications, Change in diet/appetite, Hospital admission, Bruising, Other complaints            There were no vitals filed for this visit.  Pt declined vitals    Verified current warfarin dosing schedule.    Medications reconciled.  Pt is not on antiplatelet therapy.      A/P   INR is therapeutic  Reason(s) for out of range INR today: N/A      Warfarin dosing recommendation: Continue regimen as listed above.    Pt educated to contact our clinic with any changes in medications or s/s of bleeding or thrombosis. Pt is aware to seek immediate medical attention for falls, head injury or deep cuts.    Request pt to return in 6 week(s). Pt agrees.    Cedrick Connelly, PharmD

## 2025-03-11 ENCOUNTER — ANTICOAGULATION VISIT (OUTPATIENT)
Dept: VASCULAR LAB | Facility: MEDICAL CENTER | Age: 50
End: 2025-03-11
Attending: INTERNAL MEDICINE
Payer: COMMERCIAL

## 2025-03-11 DIAGNOSIS — G45.9 TRANSIENT ISCHEMIC ATTACK: ICD-10-CM

## 2025-03-11 DIAGNOSIS — D68.61 ANTIPHOSPHOLIPID ANTIBODY SYNDROME (HCC): ICD-10-CM

## 2025-03-11 LAB — INR PPP: 2 (ref 2–3.5)

## 2025-03-11 PROCEDURE — 99211 OFF/OP EST MAY X REQ PHY/QHP: CPT | Performed by: PHARMACIST

## 2025-03-11 PROCEDURE — 85610 PROTHROMBIN TIME: CPT

## 2025-03-11 RX ORDER — WARFARIN SODIUM 5 MG/1
TABLET ORAL
Qty: 135 TABLET | Refills: 1 | Status: SHIPPED | OUTPATIENT
Start: 2025-03-11

## 2025-03-11 NOTE — PROGRESS NOTES
Anticoagulation Summary  As of 3/11/2025      INR goal:  2.0-3.0   TTR:  68.2% (4.4 y)   INR used for dosin.00 (3/11/2025)   Warfarin maintenance plan:  5 mg (10 mg x 0.5) every day   Weekly warfarin total:  35 mg   Plan last modified:  VIRGINIA Maravilla (2024)   Next INR check:  2025   Target end date:  Indefinite    Indications    Antiphospholipid antibody syndrome (HCC) [D68.61]  Transient ischemic attack [G45.9]                 Anticoagulation Episode Summary       INR check location:  Anticoagulation Clinic    Preferred lab:  --    Send INR reminders to:  --    Comments:  --          Anticoagulation Care Providers       Provider Role Specialty Phone number    Theron CRAVEN M.D. Referring Family Medicine [phone]    [facility]   [phone]                  Refer to Patient Findings for HPI:  Patient Findings       Positives:  Missed doses    Negatives:  Signs/symptoms of thrombosis, Signs/symptoms of bleeding, Laboratory test error suspected, Change in health, Change in alcohol use, Change in activity, Upcoming invasive procedure, Emergency department visit, Upcoming dental procedure, Extra doses, Change in medications, Change in diet/appetite, Hospital admission, Bruising, Other complaints            Date Referral Placed: 24      There were no vitals filed for this visit.  (Taken if pt amenable)    Verified current warfarin dosing schedule.    Medications reconciled.  Pt is not on antiplatelet therapy      A/P   INR is therapeutic    Warfarin dosing recommendation: Instructed pt to continue on with current regimen.    Pt educated to contact our clinic with any changes in medications or s/s of bleeding or thrombosis. Pt is aware to seek immediate medical attention for falls, head injury or deep cuts.    Request pt to return in 7 week(s). Pt agrees.    Edward Donnelly, MaeganD, BCACP

## 2025-04-29 ENCOUNTER — ANTICOAGULATION VISIT (OUTPATIENT)
Dept: VASCULAR LAB | Facility: MEDICAL CENTER | Age: 50
End: 2025-04-29
Attending: INTERNAL MEDICINE
Payer: COMMERCIAL

## 2025-04-29 DIAGNOSIS — D68.61 ANTIPHOSPHOLIPID ANTIBODY SYNDROME (HCC): ICD-10-CM

## 2025-04-29 DIAGNOSIS — G45.9 TRANSIENT ISCHEMIC ATTACK: ICD-10-CM

## 2025-04-29 LAB — INR PPP: 1.7 (ref 2–3.5)

## 2025-04-29 PROCEDURE — 99212 OFFICE O/P EST SF 10 MIN: CPT | Performed by: PHARMACIST

## 2025-04-29 PROCEDURE — 85610 PROTHROMBIN TIME: CPT

## 2025-04-29 NOTE — PROGRESS NOTES
Anticoagulation Summary  As of 2025      INR goal:  2.0-3.0   TTR:  66.2% (4.6 y)   INR used for dosin.70 (2025)   Warfarin maintenance plan:  5 mg (5 mg x 1) every day   Weekly warfarin total:  35 mg   Plan last modified:  Edward Donnelly, PharmD (2025)   Next INR check:  2025   Target end date:  Indefinite    Indications    Antiphospholipid antibody syndrome (HCC) [D68.61]  Transient ischemic attack [G45.9]                 Anticoagulation Episode Summary       INR check location:  Anticoagulation Clinic    Preferred lab:  --    Send INR reminders to:  --    Comments:  --          Anticoagulation Care Providers       Provider Role Specialty Phone number    Theron CRAVEN M.D. Referring Family Medicine [phone]    [facility]   [phone]                  Refer to Patient Findings for HPI:  Patient Findings       Positives:  Missed doses (on )    Negatives:  Signs/symptoms of thrombosis, Signs/symptoms of bleeding, Laboratory test error suspected, Change in health, Change in alcohol use, Change in activity, Upcoming invasive procedure, Emergency department visit, Upcoming dental procedure, Extra doses, Change in medications, Change in diet/appetite, Hospital admission, Bruising, Other complaints          Clinical Outcomes       Negatives:  Major bleeding event, Thromboembolic event, Anticoagulation-related hospital admission, Anticoagulation-related ED visit, Anticoagulation-related fatality            Date Referral Placed: 24      There were no vitals filed for this visit.  (Taken if pt amenable)    Verified current warfarin dosing schedule.    Medications reconciled.  Pt is not on antiplatelet therapy      A/P   INR is subtherapeutic    Warfarin dosing recommendation: Instructed pt to BOLUS x 1 dose w/ 10 mg today and to then continue on w/ his current regimen.    Pt educated to contact our clinic with any changes in medications or s/s of bleeding or  thrombosis. Pt is aware to seek immediate medical attention for falls, head injury or deep cuts.    Request pt to return in 1 week(s). Pt agrees.    Edward Donnelly, MaeganD, BCACP

## 2025-05-06 ENCOUNTER — ANTICOAGULATION VISIT (OUTPATIENT)
Dept: VASCULAR LAB | Facility: MEDICAL CENTER | Age: 50
End: 2025-05-06
Attending: INTERNAL MEDICINE
Payer: COMMERCIAL

## 2025-05-06 DIAGNOSIS — G45.9 TRANSIENT ISCHEMIC ATTACK: ICD-10-CM

## 2025-05-06 DIAGNOSIS — D68.61 ANTIPHOSPHOLIPID ANTIBODY SYNDROME (HCC): ICD-10-CM

## 2025-05-06 LAB — INR PPP: 3.9 (ref 2–3.5)

## 2025-05-06 PROCEDURE — 99212 OFFICE O/P EST SF 10 MIN: CPT | Performed by: PHARMACIST

## 2025-05-06 PROCEDURE — 85610 PROTHROMBIN TIME: CPT

## 2025-05-06 NOTE — PROGRESS NOTES
Anticoagulation Summary  As of 5/6/2025      INR goal:  2.0-3.0   TTR:  66.1% (4.6 y)   INR used for dosing:  3.90 (5/6/2025)   Warfarin maintenance plan:  5 mg (5 mg x 1) every day   Weekly warfarin total:  35 mg   Plan last modified:  Edward Donnelly, PharmD (4/29/2025)   Next INR check:  5/20/2025   Target end date:  Indefinite    Indications    Antiphospholipid antibody syndrome (HCC) [D68.61]  Transient ischemic attack [G45.9]                 Anticoagulation Episode Summary       INR check location:  Anticoagulation Clinic    Preferred lab:  --    Send INR reminders to:  --    Comments:  --          Anticoagulation Care Providers       Provider Role Specialty Phone number    Theron CRAVEN M.D. Referring Family Medicine [phone]    [facility]   [phone]                  Refer to Patient Findings for HPI:  Patient Findings       Positives:  Change in health, Change in diet/appetite (Lack of appetite - was vomiting over the weekend d/t acute illness. Pt notes he feels better now.)    Negatives:  Signs/symptoms of thrombosis, Signs/symptoms of bleeding, Laboratory test error suspected, Change in alcohol use, Change in activity, Upcoming invasive procedure, Emergency department visit, Upcoming dental procedure, Missed doses, Extra doses, Change in medications, Hospital admission, Bruising, Other complaints          Clinical Outcomes       Negatives:  Major bleeding event, Thromboembolic event, Anticoagulation-related hospital admission, Anticoagulation-related ED visit, Anticoagulation-related fatality            Date Referral Placed: 9/30/24      There were no vitals filed for this visit.  (Taken if pt amenable)    Verified current warfarin dosing schedule.    Medications reconciled.  Pt is not on antiplatelet therapy      A/P   INR is supratherapeutic    Warfarin dosing recommendation: Instructed pt to HOLD x 1 dose tomorrow (took his dose this AM) and to then continue on w/ his  current regimen.    Pt educated to contact our clinic with any changes in medications or s/s of bleeding or thrombosis. Pt is aware to seek immediate medical attention for falls, head injury or deep cuts.    Request pt to return in 2 week(s). Pt agrees.    Edward Donnelly, MaeganD, BCACP

## 2025-05-20 ENCOUNTER — ANTICOAGULATION VISIT (OUTPATIENT)
Dept: VASCULAR LAB | Facility: MEDICAL CENTER | Age: 50
End: 2025-05-20
Attending: INTERNAL MEDICINE
Payer: COMMERCIAL

## 2025-05-20 DIAGNOSIS — G45.9 TRANSIENT ISCHEMIC ATTACK: ICD-10-CM

## 2025-05-20 DIAGNOSIS — D68.61 ANTIPHOSPHOLIPID ANTIBODY SYNDROME (HCC): Primary | ICD-10-CM

## 2025-05-20 LAB — INR PPP: 2.5 (ref 2–3.5)

## 2025-05-20 PROCEDURE — 85610 PROTHROMBIN TIME: CPT

## 2025-05-20 PROCEDURE — 99211 OFF/OP EST MAY X REQ PHY/QHP: CPT | Performed by: PHARMACIST

## 2025-05-20 NOTE — PROGRESS NOTES
Anticoagulation Summary  As of 2025      INR goal:  2.0-3.0   TTR:  65.9% (4.6 y)   INR used for dosin.50 (2025)   Warfarin maintenance plan:  5 mg (5 mg x 1) every day   Weekly warfarin total:  35 mg   Plan last modified:  Edward Donnelly, PharmD (2025)   Next INR check:  2025   Target end date:  Indefinite    Indications    Antiphospholipid antibody syndrome (HCC) [D68.61]  Transient ischemic attack [G45.9]                 Anticoagulation Episode Summary       INR check location:  Anticoagulation Clinic    Preferred lab:  --    Send INR reminders to:  --    Comments:  --          Anticoagulation Care Providers       Provider Role Specialty Phone number    Theron CRAVEN M.D. Referring Family Medicine [phone]    [facility]   [phone]                  Refer to Patient Findings for HPI:  Patient Findings       Negatives:  Signs/symptoms of thrombosis, Signs/symptoms of bleeding, Laboratory test error suspected, Change in health, Change in alcohol use, Change in activity, Upcoming invasive procedure, Emergency department visit, Upcoming dental procedure, Missed doses, Extra doses, Change in medications, Change in diet/appetite, Hospital admission, Bruising, Other complaints          Clinical Outcomes       Negatives:  Major bleeding event, Thromboembolic event, Anticoagulation-related hospital admission, Anticoagulation-related ED visit, Anticoagulation-related fatality            Date Referral Placed: 24      There were no vitals filed for this visit.  (Taken if pt amenable)    Verified current warfarin dosing schedule.    Medications reconciled.  Pt is not on antiplatelet therapy      A/P   INR is therapeutic    Warfarin dosing recommendation: Instructed pt to continue on with current regimen.    Pt educated to contact our clinic with any changes in medications or s/s of bleeding or thrombosis. Pt is aware to seek immediate medical attention for falls,  head injury or deep cuts.    Request pt to return in 4 week(s). Pt agrees.    Edward Donnelly, MaeganD, BCACP

## 2025-06-17 ENCOUNTER — ANTICOAGULATION VISIT (OUTPATIENT)
Dept: VASCULAR LAB | Facility: MEDICAL CENTER | Age: 50
End: 2025-06-17
Attending: INTERNAL MEDICINE
Payer: COMMERCIAL

## 2025-06-17 DIAGNOSIS — D68.61 ANTIPHOSPHOLIPID ANTIBODY SYNDROME (HCC): Primary | ICD-10-CM

## 2025-06-17 DIAGNOSIS — G45.9 TRANSIENT ISCHEMIC ATTACK: ICD-10-CM

## 2025-06-17 LAB — INR PPP: 3.1 (ref 2–3.5)

## 2025-06-17 PROCEDURE — 99212 OFFICE O/P EST SF 10 MIN: CPT | Performed by: PHARMACIST

## 2025-06-17 PROCEDURE — 85610 PROTHROMBIN TIME: CPT

## 2025-06-17 NOTE — PROGRESS NOTES
Anticoagulation Summary  As of 6/17/2025      INR goal:  2.0-3.0   TTR:  66.2% (4.7 y)   INR used for dosing:  3.10 (6/17/2025)   Warfarin maintenance plan:  5 mg (5 mg x 1) every day   Weekly warfarin total:  35 mg   Plan last modified:  Edward Donnelly, PharmD (4/29/2025)   Next INR check:  7/1/2025   Target end date:  Indefinite    Indications    Antiphospholipid antibody syndrome (HCC) [D68.61]  Transient ischemic attack [G45.9]                 Anticoagulation Episode Summary       INR check location:  Anticoagulation Clinic    Preferred lab:  --    Send INR reminders to:  AMB ANTICOAG POOL    Comments:  --          Anticoagulation Care Providers       Provider Role Specialty Phone number    Theron CRAVEN M.D. Referring Family Medicine [phone]    [facility]   [phone]                  Refer to Patient Findings for HPI:  Patient Findings       Positives:  Change in diet/appetite (Eating less lately)    Negatives:  Signs/symptoms of thrombosis, Signs/symptoms of bleeding, Laboratory test error suspected, Change in health, Change in alcohol use, Change in activity, Upcoming invasive procedure, Emergency department visit, Upcoming dental procedure, Missed doses, Extra doses, Change in medications, Hospital admission, Bruising, Other complaints          Clinical Outcomes       Negatives:  Major bleeding event, Thromboembolic event, Anticoagulation-related hospital admission, Anticoagulation-related ED visit, Anticoagulation-related fatality            Date Referral Placed: 9/30/24      Vitals:  There were no vitals filed for this visit.  (Taken if pt amenable)    Verified current warfarin dosing schedule.    Medications reconciled.  Pt is not on antiplatelet therapy.      A/P   INR is slightly supratherapeutic      Warfarin dosing recommendation: Continue regimen as listed above. Advised pt to increase dietary greens slightly.    Pt educated to contact our clinic with any changes in  medications or s/s of bleeding or thrombosis. Pt is aware to seek immediate medical attention for falls, head injury or deep cuts.    Request pt to return in 2 week(s). Pt agrees.    Edward Donnelly, MaeganD, BCACP

## 2025-07-01 ENCOUNTER — APPOINTMENT (OUTPATIENT)
Dept: VASCULAR LAB | Facility: MEDICAL CENTER | Age: 50
End: 2025-07-01
Attending: INTERNAL MEDICINE
Payer: COMMERCIAL

## 2025-08-13 ENCOUNTER — ANTICOAGULATION VISIT (OUTPATIENT)
Dept: VASCULAR LAB | Facility: MEDICAL CENTER | Age: 50
End: 2025-08-13
Attending: INTERNAL MEDICINE
Payer: COMMERCIAL

## 2025-08-13 VITALS — DIASTOLIC BLOOD PRESSURE: 82 MMHG | SYSTOLIC BLOOD PRESSURE: 129 MMHG | HEART RATE: 52 BPM

## 2025-08-13 DIAGNOSIS — Z86.718 HISTORY OF DVT IN ADULTHOOD: ICD-10-CM

## 2025-08-13 DIAGNOSIS — D68.61 ANTIPHOSPHOLIPID ANTIBODY SYNDROME (HCC): Primary | ICD-10-CM

## 2025-08-13 DIAGNOSIS — G45.9 TRANSIENT ISCHEMIC ATTACK: ICD-10-CM

## 2025-08-13 LAB — INR PPP: 3.4 (ref 2–3.5)

## 2025-08-13 PROCEDURE — 99213 OFFICE O/P EST LOW 20 MIN: CPT | Performed by: NURSE PRACTITIONER

## 2025-08-13 PROCEDURE — 85610 PROTHROMBIN TIME: CPT
